# Patient Record
Sex: MALE | URBAN - METROPOLITAN AREA
[De-identification: names, ages, dates, MRNs, and addresses within clinical notes are randomized per-mention and may not be internally consistent; named-entity substitution may affect disease eponyms.]

---

## 2022-02-18 ENCOUNTER — APPOINTMENT (OUTPATIENT)
Dept: URBAN - METROPOLITAN AREA CLINIC 321 | Age: 32
Setting detail: DERMATOLOGY
End: 2022-02-21

## 2022-02-18 DIAGNOSIS — D485 NEOPLASM OF UNCERTAIN BEHAVIOR OF SKIN: ICD-10-CM

## 2022-02-18 PROBLEM — D48.5 NEOPLASM OF UNCERTAIN BEHAVIOR OF SKIN: Status: ACTIVE | Noted: 2022-02-18

## 2022-02-18 PROCEDURE — OTHER DEFER: OTHER

## 2022-02-18 PROCEDURE — 99202 OFFICE O/P NEW SF 15 MIN: CPT

## 2022-02-18 ASSESSMENT — LOCATION SIMPLE DESCRIPTION DERM: LOCATION SIMPLE: RIGHT CHEEK

## 2022-02-18 ASSESSMENT — LOCATION ZONE DERM: LOCATION ZONE: FACE

## 2022-02-18 ASSESSMENT — LOCATION DETAILED DESCRIPTION DERM: LOCATION DETAILED: RIGHT CENTRAL MALAR CHEEK

## 2022-02-18 NOTE — PROCEDURE: DEFER
Instructions (Optional): 10 mm punch
Procedure To Be Performed At Next Visit: Excision by punch method
Introduction Text (Please End With A Colon): The following procedure was deferred:
Detail Level: Simple

## 2022-03-03 ENCOUNTER — APPOINTMENT (OUTPATIENT)
Dept: URBAN - METROPOLITAN AREA CLINIC 321 | Age: 32
Setting detail: DERMATOLOGY
End: 2022-03-04

## 2022-03-03 DIAGNOSIS — D22 MELANOCYTIC NEVI: ICD-10-CM

## 2022-03-03 PROBLEM — D48.5 NEOPLASM OF UNCERTAIN BEHAVIOR OF SKIN: Status: ACTIVE | Noted: 2022-03-03

## 2022-03-03 PROCEDURE — 11441 EXC FACE-MM B9+MARG 0.6-1 CM: CPT

## 2022-03-03 PROCEDURE — 12051 INTMD RPR FACE/MM 2.5 CM/<: CPT

## 2022-03-03 PROCEDURE — OTHER PUNCH EXCISION: OTHER

## 2022-03-03 ASSESSMENT — LOCATION SIMPLE DESCRIPTION DERM: LOCATION SIMPLE: RIGHT CHEEK

## 2022-03-03 ASSESSMENT — LOCATION DETAILED DESCRIPTION DERM: LOCATION DETAILED: RIGHT INFERIOR CENTRAL MALAR CHEEK

## 2022-03-03 ASSESSMENT — LOCATION ZONE DERM: LOCATION ZONE: FACE

## 2022-03-03 NOTE — PROCEDURE: PUNCH EXCISION
Post-Care Instructions: I reviewed with the patient in detail post-care instructions. Patient is to keep the biopsy site dry 36hrs, and then apply Vaseline twice daily until healed. Patient may apply hydrogen peroxide soaks to remove any crusting.

## 2022-03-03 NOTE — PROCEDURE: PUNCH EXCISION
5 Mm Punch Excision Text: A 5 mm punch biopsy was used to excise the lesion to the level of the subcutaneous fat.  Blunt dissection was used to free the lesion from the surrounding tissues and the lesion was removed. -Likely 2/2 GIB.  -Tele monitoring d/c'd s/p no events. Etiology likely not cardiac in origin.  -EKG WNL

## 2022-03-10 ENCOUNTER — APPOINTMENT (OUTPATIENT)
Dept: URBAN - METROPOLITAN AREA CLINIC 321 | Age: 32
Setting detail: DERMATOLOGY
End: 2022-03-11

## 2022-03-10 DIAGNOSIS — Z48.02 ENCOUNTER FOR REMOVAL OF SUTURES: ICD-10-CM

## 2022-03-10 PROCEDURE — OTHER SUTURE REMOVAL (GLOBAL PERIOD): OTHER

## 2022-03-10 PROCEDURE — 99024 POSTOP FOLLOW-UP VISIT: CPT

## 2022-03-10 ASSESSMENT — LOCATION ZONE DERM: LOCATION ZONE: FACE

## 2022-03-10 ASSESSMENT — LOCATION SIMPLE DESCRIPTION DERM: LOCATION SIMPLE: RIGHT CHEEK

## 2022-03-10 ASSESSMENT — LOCATION DETAILED DESCRIPTION DERM: LOCATION DETAILED: RIGHT INFERIOR CENTRAL MALAR CHEEK

## 2022-03-10 NOTE — PROCEDURE: SUTURE REMOVAL (GLOBAL PERIOD)
Detail Level: Detailed
Add 88585 Cpt? (Important Note: In 2017 The Use Of 92307 Is Being Tracked By Cms To Determine Future Global Period Reimbursement For Global Periods): yes

## 2024-04-12 ENCOUNTER — HOSPITAL ENCOUNTER (INPATIENT)
Facility: HOSPITAL | Age: 34
LOS: 1 days | Discharge: HOME | End: 2024-04-15
Attending: EMERGENCY MEDICINE | Admitting: HOSPITALIST
Payer: MEDICAID

## 2024-04-12 ENCOUNTER — APPOINTMENT (OUTPATIENT)
Dept: GENERAL RADIOLOGY | Facility: HOSPITAL | Age: 34
End: 2024-04-12
Attending: EMERGENCY MEDICINE
Payer: MEDICAID

## 2024-04-12 DIAGNOSIS — R22.42 LOCALIZED SWELLING OF TOE OF LEFT FOOT: Primary | ICD-10-CM

## 2024-04-12 DIAGNOSIS — M86.9 OSTEOMYELITIS (HCC): ICD-10-CM

## 2024-04-12 PROBLEM — R79.82 ELEVATED C-REACTIVE PROTEIN (CRP): Status: ACTIVE | Noted: 2024-04-12

## 2024-04-12 PROBLEM — M79.89 MASS OF SOFT TISSUE OF FOOT: Status: ACTIVE | Noted: 2024-04-12

## 2024-04-12 PROBLEM — D72.829 LEUKOCYTOSIS: Status: ACTIVE | Noted: 2024-04-12

## 2024-04-12 PROBLEM — R70.0 ESR RAISED: Status: ACTIVE | Noted: 2024-04-12

## 2024-04-12 LAB
ALBUMIN SERPL-MCNC: 3.6 G/DL (ref 3.4–5)
ALBUMIN/GLOB SERPL: 0.9 {RATIO} (ref 1–2)
ALP LIVER SERPL-CCNC: 80 U/L
ALT SERPL-CCNC: 35 U/L
ANION GAP SERPL CALC-SCNC: 3 MMOL/L (ref 0–18)
AST SERPL-CCNC: 13 U/L (ref 15–37)
BASOPHILS # BLD AUTO: 0.06 X10(3) UL (ref 0–0.2)
BASOPHILS NFR BLD AUTO: 0.5 %
BILIRUB SERPL-MCNC: 0.3 MG/DL (ref 0.1–2)
BUN BLD-MCNC: 9 MG/DL (ref 9–23)
CALCIUM BLD-MCNC: 9.2 MG/DL (ref 8.5–10.1)
CHLORIDE SERPL-SCNC: 111 MMOL/L (ref 98–112)
CO2 SERPL-SCNC: 24 MMOL/L (ref 21–32)
CREAT BLD-MCNC: 0.78 MG/DL
CRP SERPL-MCNC: 1.26 MG/DL (ref ?–0.3)
EGFRCR SERPLBLD CKD-EPI 2021: 120 ML/MIN/1.73M2 (ref 60–?)
EOSINOPHIL # BLD AUTO: 0.06 X10(3) UL (ref 0–0.7)
EOSINOPHIL NFR BLD AUTO: 0.5 %
ERYTHROCYTE [DISTWIDTH] IN BLOOD BY AUTOMATED COUNT: 19.9 %
ERYTHROCYTE [SEDIMENTATION RATE] IN BLOOD: 52 MM/HR
EST. AVERAGE GLUCOSE BLD GHB EST-MCNC: 137 MG/DL (ref 68–126)
GLOBULIN PLAS-MCNC: 3.9 G/DL (ref 2.8–4.4)
GLUCOSE BLD-MCNC: 100 MG/DL (ref 70–99)
GLUCOSE BLD-MCNC: 122 MG/DL (ref 70–99)
GLUCOSE BLD-MCNC: 250 MG/DL (ref 70–99)
HBA1C MFR BLD: 6.4 % (ref ?–5.7)
HCT VFR BLD AUTO: 35.7 %
HGB BLD-MCNC: 11.4 G/DL
IMM GRANULOCYTES # BLD AUTO: 0.04 X10(3) UL (ref 0–1)
IMM GRANULOCYTES NFR BLD: 0.4 %
LYMPHOCYTES # BLD AUTO: 3.24 X10(3) UL (ref 1–4)
LYMPHOCYTES NFR BLD AUTO: 28.4 %
MCH RBC QN AUTO: 25 PG (ref 26–34)
MCHC RBC AUTO-ENTMCNC: 31.9 G/DL (ref 31–37)
MCV RBC AUTO: 78.3 FL
MONOCYTES # BLD AUTO: 0.68 X10(3) UL (ref 0.1–1)
MONOCYTES NFR BLD AUTO: 6 %
NEUTROPHILS # BLD AUTO: 7.32 X10 (3) UL (ref 1.5–7.7)
NEUTROPHILS # BLD AUTO: 7.32 X10(3) UL (ref 1.5–7.7)
NEUTROPHILS NFR BLD AUTO: 64.2 %
OSMOLALITY SERPL CALC.SUM OF ELEC: 285 MOSM/KG (ref 275–295)
PLATELET # BLD AUTO: 359 10(3)UL (ref 150–450)
POTASSIUM SERPL-SCNC: 3.9 MMOL/L (ref 3.5–5.1)
PROT SERPL-MCNC: 7.5 G/DL (ref 6.4–8.2)
RBC # BLD AUTO: 4.56 X10(6)UL
SODIUM SERPL-SCNC: 138 MMOL/L (ref 136–145)
WBC # BLD AUTO: 11.4 X10(3) UL (ref 4–11)

## 2024-04-12 PROCEDURE — 73630 X-RAY EXAM OF FOOT: CPT | Performed by: EMERGENCY MEDICINE

## 2024-04-12 PROCEDURE — 99223 1ST HOSP IP/OBS HIGH 75: CPT | Performed by: HOSPITALIST

## 2024-04-12 PROCEDURE — 99223 1ST HOSP IP/OBS HIGH 75: CPT | Performed by: STUDENT IN AN ORGANIZED HEALTH CARE EDUCATION/TRAINING PROGRAM

## 2024-04-12 RX ORDER — NICOTINE POLACRILEX 4 MG
30 LOZENGE BUCCAL
Status: DISCONTINUED | OUTPATIENT
Start: 2024-04-12 | End: 2024-04-15

## 2024-04-12 RX ORDER — MORPHINE SULFATE 2 MG/ML
1 INJECTION, SOLUTION INTRAMUSCULAR; INTRAVENOUS EVERY 2 HOUR PRN
Status: DISCONTINUED | OUTPATIENT
Start: 2024-04-12 | End: 2024-04-15

## 2024-04-12 RX ORDER — CEFAZOLIN SODIUM/WATER 2 G/20 ML
2 SYRINGE (ML) INTRAVENOUS ONCE
Status: COMPLETED | OUTPATIENT
Start: 2024-04-12 | End: 2024-04-12

## 2024-04-12 RX ORDER — ONDANSETRON 2 MG/ML
4 INJECTION INTRAMUSCULAR; INTRAVENOUS EVERY 4 HOURS PRN
Status: DISCONTINUED | OUTPATIENT
Start: 2024-04-12 | End: 2024-04-12

## 2024-04-12 RX ORDER — NICOTINE POLACRILEX 4 MG
15 LOZENGE BUCCAL
Status: DISCONTINUED | OUTPATIENT
Start: 2024-04-12 | End: 2024-04-15

## 2024-04-12 RX ORDER — BISACODYL 10 MG
10 SUPPOSITORY, RECTAL RECTAL
Status: DISCONTINUED | OUTPATIENT
Start: 2024-04-12 | End: 2024-04-15

## 2024-04-12 RX ORDER — BENZONATATE 100 MG/1
200 CAPSULE ORAL 3 TIMES DAILY PRN
Status: DISCONTINUED | OUTPATIENT
Start: 2024-04-12 | End: 2024-04-15

## 2024-04-12 RX ORDER — ACETAMINOPHEN 500 MG
1000 TABLET ORAL EVERY 6 HOURS PRN
Status: DISCONTINUED | OUTPATIENT
Start: 2024-04-12 | End: 2024-04-15

## 2024-04-12 RX ORDER — CEFAZOLIN SODIUM/WATER 2 G/20 ML
2 SYRINGE (ML) INTRAVENOUS EVERY 8 HOURS
Status: DISCONTINUED | OUTPATIENT
Start: 2024-04-12 | End: 2024-04-15

## 2024-04-12 RX ORDER — SENNOSIDES 8.6 MG
17.2 TABLET ORAL NIGHTLY PRN
Status: DISCONTINUED | OUTPATIENT
Start: 2024-04-12 | End: 2024-04-15

## 2024-04-12 RX ORDER — ENEMA 19; 7 G/133ML; G/133ML
1 ENEMA RECTAL ONCE AS NEEDED
Status: DISCONTINUED | OUTPATIENT
Start: 2024-04-12 | End: 2024-04-15

## 2024-04-12 RX ORDER — MELATONIN
3 NIGHTLY PRN
Status: DISCONTINUED | OUTPATIENT
Start: 2024-04-12 | End: 2024-04-15

## 2024-04-12 RX ORDER — ONDANSETRON 2 MG/ML
4 INJECTION INTRAMUSCULAR; INTRAVENOUS EVERY 6 HOURS PRN
Status: DISCONTINUED | OUTPATIENT
Start: 2024-04-12 | End: 2024-04-15

## 2024-04-12 RX ORDER — POLYETHYLENE GLYCOL 3350 17 G/17G
17 POWDER, FOR SOLUTION ORAL DAILY PRN
Status: DISCONTINUED | OUTPATIENT
Start: 2024-04-12 | End: 2024-04-15

## 2024-04-12 RX ORDER — DEXTROSE MONOHYDRATE 25 G/50ML
50 INJECTION, SOLUTION INTRAVENOUS
Status: DISCONTINUED | OUTPATIENT
Start: 2024-04-12 | End: 2024-04-15

## 2024-04-12 RX ORDER — MORPHINE SULFATE 4 MG/ML
4 INJECTION, SOLUTION INTRAMUSCULAR; INTRAVENOUS EVERY 30 MIN PRN
Status: ACTIVE | OUTPATIENT
Start: 2024-04-12 | End: 2024-04-12

## 2024-04-12 RX ORDER — MORPHINE SULFATE 2 MG/ML
2 INJECTION, SOLUTION INTRAMUSCULAR; INTRAVENOUS EVERY 2 HOUR PRN
Status: DISCONTINUED | OUTPATIENT
Start: 2024-04-12 | End: 2024-04-15

## 2024-04-12 RX ORDER — MORPHINE SULFATE 4 MG/ML
4 INJECTION, SOLUTION INTRAMUSCULAR; INTRAVENOUS EVERY 2 HOUR PRN
Status: DISCONTINUED | OUTPATIENT
Start: 2024-04-12 | End: 2024-04-15

## 2024-04-12 NOTE — CONSULTS
[facility]   part of [facility]    Report of Consultation    Tai Zeng Patient Status:  Observation    1990 MRN [id number]   Location [facility] 3SW-A Attending Abdi Albright MD   Hosp Day # 0 PCP Unknown Pcp     Date of Admission:  2024  Date of Consult:  2024    Reason for Consultation:  Painful soft tissue mass, left 4th/5th toes    History of Present Illness:  Tai Zeng is a a(n) 34 year old male with past medical history of prediabetes who is seen at bedside this afternoon for evaluation of painful soft tissue mass to left fourth and fifth toes.  Patient relates he has had mass to site for several years but it has gotten worse over the last several months.  He was seen at outside hospitals where site was then to be drained but only blood came out per patient.  He presented to the emergency room today site continued to bleed.  He has seen a podiatrist in the past but they recommended toe spacers.  He is wondering what treatment options are for mass.  It is very painful and tends to bleed.  It is also getting larger in size.  He cannot think of any recent trauma or injury or other causes for this development.  No other complaints are mentioned.  Past medical history, indications, and allergies reviewed.    History:  Past Medical History:    Anemia    Diabetes (HCC)    High blood pressure     History reviewed. No pertinent surgical history.  No family history on file.   reports that he has been smoking cigarettes. He has never used smokeless tobacco. He reports current alcohol use. He reports current drug use. Drug: Cannabis.    Allergies:  No Known Allergies    Medications:    Current Facility-Administered Medications:     glucose (Dex4) 15 GM/59ML oral liquid 15 g, 15 g, Oral, Q15 Min PRN **OR** glucose (Glutose) 40% oral gel 15 g, 15 g, Oral, Q15 Min PRN **OR** glucose-vitamin C (Dex-4) chewable tab 4 tablet, 4 tablet, Oral, Q15 Min PRN **OR** dextrose 50%  injection 50 mL, 50 mL, Intravenous, Q15 Min PRN **OR** glucose (Dex4) 15 GM/59ML oral liquid 30 g, 30 g, Oral, Q15 Min PRN **OR** glucose (Glutose) 40% oral gel 30 g, 30 g, Oral, Q15 Min PRN **OR** glucose-vitamin C (Dex-4) chewable tab 8 tablet, 8 tablet, Oral, Q15 Min PRN    acetaminophen (Tylenol Extra Strength) tab 1,000 mg, 1,000 mg, Oral, Q6H PRN    melatonin tab 3 mg, 3 mg, Oral, Nightly PRN    benzonatate (Tessalon) cap 200 mg, 200 mg, Oral, TID PRN    guaiFENesin (Robitussin) 100 MG/5 ML oral liquid 200 mg, 200 mg, Oral, Q4H PRN    ceFAZolin (Ancef) 2 g in 20mL IV syringe premix, 2 g, Intravenous, Q8H    morphINE PF 2 MG/ML injection 1 mg, 1 mg, Intravenous, Q2H PRN **OR** morphINE PF 2 MG/ML injection 2 mg, 2 mg, Intravenous, Q2H PRN **OR** morphINE PF 4 MG/ML injection 4 mg, 4 mg, Intravenous, Q2H PRN    ondansetron (Zofran) 4 MG/2ML injection 4 mg, 4 mg, Intravenous, Q6H PRN    sennosides (Senokot) tab 17.2 mg, 17.2 mg, Oral, Nightly PRN    bisacodyl (Dulcolax) 10 MG rectal suppository 10 mg, 10 mg, Rectal, Daily PRN    fleet enema (Fleet) 7-19 GM/118ML rectal enema 133 mL, 1 enema, Rectal, Once PRN    polyethylene glycol (PEG 3350) (Miralax) 17 g oral packet 17 g, 17 g, Oral, Daily PRN    insulin aspart (NovoLOG) 100 Units/mL FlexPen 1-10 Units, 1-10 Units, Subcutaneous, TID AC and HS    Review of Systems: Denies nausea, vomiting, fever, chills.      Physical Exam:              Derm: Large fluid-filled mass noted distal aspect of left fourth toe and to lesser extent left fifth toe.  Site is movable and tender to touch.  Sanguinous drainage noted with sutures intact to site.  No warmth or other concerns.  See images above.    Vascular: Pedal pulses easily palpable.    Neuro: Gross sensation intact to digits.    MSK: Large soft tissue mass noted distal aspect left fourth toe to lesser extent left fifth toe.  Mild pain noted to sites.  Compartments are soft compressible.  Strength testing  deferred.    Imaging:  XR FOOT, COMPLETE (MIN 3 VIEWS), LEFT (CPT=73630)    Result Date: 4/12/2024  CONCLUSION:  1. Marked masslike soft tissue swelling surrounds the 4th distal phalanx most likely represents patient's known hematoma given the history.  If osteomyelitis is of high clinical concern, recommend MRI for further evaluation.   LOCATION:  [facility]   Dictated by (CST): Ciara Jaquez MD on 4/12/2024 at 10:58 AM     Finalized by (CST): Ciara Jaquez MD on 4/12/2024 at 11:01 AM         Laboratory Data:  Recent Labs   Lab 04/12/24  1028   RBC 4.56   HGB 11.4*   HCT 35.7*   MCV 78.3*   MCH 25.0*   MCHC 31.9   RDW 19.9   NEPRELIM 7.32   WBC 11.4*   .0       Impression and Plan:  Patient Active Problem List   Diagnosis    Localized swelling of toe of left foot    Leukocytosis    Elevated C-reactive protein (CRP)    ESR raised       -Patient examined, chart history reviewed.  -Patient is afebrile, white blood cell 11.4.  -CRP 1.26, ESR 52.  -X-rays reviewed.  Large soft tissue swelling at distal fourth toe.  No erosions or other concerns appreciated.  -MRI with and without contrast ordered to left foot.  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  This has become larger and more painful over the last 2 years.  In my opinion patient will require surgical intervention.  Discussed with patient we will likely consider soft tissue mass resection with or without partial fourth and fifth toe amputation pending MRI results.  -Surgery briefly discussed including benefits, risks, and recovery period.  -Okay for patient weight-bear as tolerated for the time being.  -Will continue to follow.    Papo Mcnally DPM   4/12/2024  4:41 PM

## 2024-04-12 NOTE — ED PROVIDER NOTES
Patient Seen in: [facility] Emergency Department      History     Chief Complaint   Patient presents with    Wound Care     Stated Complaint: hematoma to 4th digit on left foot.  bleeding, increased last night.    Subjective:   HPI    Patient is a 34-year-old male who presents with bleeding from his left fourth toe since last night.  He states it has been very swollen for a couple of years.  Back when it started he states he was wearing some shoes that were too small for him, developed some swelling and pain there.  At 1 point he saw podiatrist who told him it was a hematoma but it has continued to slowly get progressively bigger ever since.  He has been trying to find the another podiatrist for follow-up but has had trouble getting into 1 due to his insurance.  Last night it spontaneously started bleeding from the tip, he has wrapped it up but states it continued to bleed ever since.  It is painful.  No recent trauma.    Objective:   Past Medical History:    Anemia    Diabetes (HCC)              History reviewed. No pertinent surgical history.             Social History     Socioeconomic History    Marital status: Single   Tobacco Use    Smoking status: Every Day     Types: Cigarettes    Smokeless tobacco: Never   Vaping Use    Vaping status: Never Used   Substance and Sexual Activity    Alcohol use: Yes     Comment: occ    Drug use: Yes     Types: Cannabis     Social Determinants of Health     Food Insecurity: Food Insecurity Present (11/30/2023)    Received from [facility]    Hunger Vital Sign     Worried About Running Out of Food in the Last Year: Sometimes true   Transportation Needs: Unknown (11/30/2023)    Received from [facility]    PRAPARE - Transportation     Lack of Transportation (Non-Medical): No   Housing Stability: High Risk (11/30/2023)    Received from [facility]    Housing Stability Vital Sign     Unable to Pay for Housing in the  Last Year: Yes              Review of Systems    Positive for stated complaint: hematoma to 4th digit on left foot.  bleeding, increased last night.  Other systems are as noted in HPI.  Constitutional and vital signs reviewed.      All other systems reviewed and negative except as noted above.    Physical Exam     ED Triage Vitals [04/12/24 0948]   BP (!) 145/98   Pulse 82   Resp 18   Temp 97.2 °F (36.2 °C)   Temp src Temporal   SpO2 99 %   O2 Device None (Room air)       Current:BP (!) 148/92   Pulse 88   Temp 97.2 °F (36.2 °C) (Temporal)   Resp 18   Ht 177.8 cm (5' 10\")   Wt 113.4 kg   SpO2 99%   BMI 35.87 kg/m²         Physical Exam  Vitals and nursing note reviewed.   Constitutional:       Appearance: He is well-developed.   HENT:      Head: Normocephalic and atraumatic.   Eyes:      Conjunctiva/sclera: Conjunctivae normal.      Pupils: Pupils are equal, round, and reactive to light.   Cardiovascular:      Rate and Rhythm: Normal rate and regular rhythm.      Heart sounds: Normal heart sounds.   Pulmonary:      Effort: Pulmonary effort is normal.      Breath sounds: Normal breath sounds.   Abdominal:      General: Bowel sounds are normal.      Palpations: Abdomen is soft.   Musculoskeletal:         General: Normal range of motion.      Comments: The left fourth toe has significant swelling over the distal tip to about 3 times its normal size.  It is not indurated or fluctuant, fairly soft but it is tender.  There is active arterial spurting from a small vessel in a circular open wound at the tip of the toe.  The rest of the toe does have swelling as well although not to the degree that the tip does.   Skin:     General: Skin is warm and dry.   Neurological:      Mental Status: He is alert and oriented to person, place, and time.               ED Course     Labs Reviewed   COMP METABOLIC PANEL (14) - Abnormal; Notable for the following components:       Result Value    Glucose 100 (*)     AST 13 (*)      A/G Ratio 0.9 (*)     All other components within normal limits   SED RATE, WESTERGREN (AUTOMATED) - Abnormal; Notable for the following components:    Sed Rate 52 (*)     All other components within normal limits   C-REACTIVE PROTEIN - Abnormal; Notable for the following components:    C-Reactive Protein 1.26 (*)     All other components within normal limits   HEMOGLOBIN A1C - Abnormal; Notable for the following components:    HgbA1C 6.4 (*)     Estimated Average Glucose 137 (*)     All other components within normal limits   CBC W/ DIFFERENTIAL - Abnormal; Notable for the following components:    WBC 11.4 (*)     HGB 11.4 (*)     HCT 35.7 (*)     MCV 78.3 (*)     MCH 25.0 (*)     All other components within normal limits   CBC WITH DIFFERENTIAL WITH PLATELET    Narrative:     The following orders were created for panel order CBC With Differential With Platelet.  Procedure                               Abnormality         Status                     ---------                               -----------         ------                     CBC W/ DIFFERENTIAL[[id number]]          Abnormal            Final result                 Please view results for these tests on the individual orders.             XR FOOT, COMPLETE (MIN 3 VIEWS), LEFT (CPT=73630)    Result Date: 4/12/2024  PROCEDURE:  XR FOOT, COMPLETE (MIN 3 VIEWS), LEFT (CPT=73630)  TECHNIQUE:  AP, oblique, and lateral views were obtained.  COMPARISON:  None.  INDICATIONS:  hematoma to 4th digit on left foot.  bleeding, increased last night.  PATIENT STATED HISTORY: (As transcribed by Technologist)  Patient's states that his left foot, his toes in particular, began to swell up about a year ago and now they are bleeding. He didn't have any particular injury. He believes the swelling may have started due to wearing the wrong size shoes.    FINDINGS:  BONES:  Osseous structures are intact.  Joint spaces are preserved.  No significant arthropathy.  Mild hallux valgus  deformity.  No dislocation. SOFT TISSUES:  Marked soft tissue swelling surrounds and distal to the 4th distal phalanx.            CONCLUSION:  1. Marked masslike soft tissue swelling surrounds the 4th distal phalanx most likely represents patient's known hematoma given the history.  If osteomyelitis is of high clinical concern, recommend MRI for further evaluation.   LOCATION:  [facility]   Dictated by (CST): Ciara Jaquez MD on 4/12/2024 at 10:58 AM     Finalized by (CST): Ciara Jaquez MD on 4/12/2024 at 11:01 AM               MDM      Patient is a 34-year-old male presenting with bleeding from the tip of his left fourth toe.  He states he has had swelling there that has been slowly progressing for couple of years.  It is painful.  No recent or remote trauma, it started after he wore a pair of shoes that was too tight.  Last night it spontaneously started bleeding and has continued to bleed overnight.  He has it wrapped up and heavily taped but I can visualize a lot of clot around the lateral side of his distal foot.  Upon removal there is significant soft tissue swelling to the tip of the left fourth toe.  Once overlying hematoma was removed there is active arterial spurting from a small vessel in the middle of an open wound at the tip of his toe.  I quickly did a digital block with lidocaine without epinephrine and placed two 4-0 nylon sutures over this open wound and does appear to have hemostasis right now.  Unclear if this is a hematoma although he states has been slowly building up for a couple of years and would not expect just a benign hematoma to do that.  He has a history of prediabetes although admits he does not go to the doctor regularly so it may be that he is diabetic and this is an osteomyelitis or underlying infection.  Labs including sed rate and CRP ordered along with an x-ray.  Admission disposition: 4/12/2024 12:41 PM           Update at 12:35 PM.  X-ray as above with significant soft tissue swelling at  the tip of the toe but no obvious bony abnormality, no fracture or definite osteomyelitis.  Mild leukocytosis inflammatory markers are mildly elevated as well.  Etiology is unclear.  No further bleeding upon repeat examination.  I discussed case with Dr. Mcnally of podiatry who is able to review the x-ray.  He agrees there is marked soft tissue swelling there of unclear etiology.  He does agree that would be reasonable to admit the patient least tonight for further investigation and further evaluation by him.  May need additional imaging including MRI.  Discussed case with Dr. Albright the hospitalist today.  He does agree that it is reasonable to cover patient with a dose of antibiotics.        Past Medical History-anemia, prediabetic osteomyelitis,    Differential diagnosis before testing included bony mass, fracture, soft tissue mass    Co-morbidities that add to the complexity of management include: None    Testing ordered during this visit included labs, x-ray    Radiographic images  I personally reviewed the radiographs and my individual interpretation shows soft tissue swelling, no definite osteomyelitis  I also reviewed the official reports that showed soft tissue swelling, no osteomyelitis      Discussion of management with podiatry, hospitalist        Medications Provided: Ancef            Disposition:    Admission  I have discussed with the patient the results of test, differential diagnosis, and treatment plan. They expressed clear understanding of these instructions and agrees to the plan provided.                                Medical Decision Making      Disposition and Plan     Clinical Impression:  1. Localized swelling of toe of left foot         Disposition:  Admit  4/12/2024 12:41 pm    Follow-up:  No follow-up provider specified.        Medications Prescribed:  There are no discharge medications for this patient.                        Hospital Problems       Present on Admission              ICD-10-CM Noted POA    * (Principal) Localized swelling of toe of left foot R22.42 4/12/2024 Unknown

## 2024-04-12 NOTE — ED QUICK NOTES
Orders for admission, patient is aware of plan and ready to go upstairs. Any questions, please call [facility] MARCELO vázquez  at extension [phone]     Patient Covid vaccination status: Partially vaccinated     COVID Test Ordered in ED: None    COVID Suspicion at Admission: N/A    Running Infusions:  None    Mental Status/LOC at time of transport: a0x3    Other pertinent information:   CIWA score: N/A   NIH score:  N/A

## 2024-04-12 NOTE — H&P
[facility]     Tai Zeng Patient Status:  Emergency    1990 MRN [id number]   Location [facility] EMERGENCY DEPARTMENT Attending Guerrero Silva MD   Hosp Day # 0 PCP Unknown Pcp     Chief Complaint: toe bleeding     Subjective:    History of Present Illness:   Tai Zeng is a 34 year old male with worsening left tongue pain and swelling and now actively bleeding.  Patient states that his left foot has become mildly swollen over the last couple days.  He states that his left fourth toe has been having swelling and issues for 2 years.  However, became increasingly red and swollen and then started actively discharging blood which led him to the emergency department.  He denies fevers or chills.  He is prediabetic.  He is on no other medications.  He denies any other complaints at this time.    History/Other:    Past Medical History:  Past Medical History:    Anemia    Diabetes (HCC)     Past Surgical History:   History reviewed. No pertinent surgical history.   Family History:   No family history on file.  Social History:    reports that he has been smoking cigarettes. He has never used smokeless tobacco. He reports current alcohol use. He reports current drug use. Drug: Cannabis.   Allergies: No Known Allergies  Medications:    No current facility-administered medications on file prior to encounter.     No current outpatient medications on file prior to encounter.     Review of Systems:   A comprehensive review of systems was completed.    Pertinent positives and negatives noted in the HPI.    Objective:   Physical Exam:    BP (!) 146/96   Pulse 88   Temp 97.2 °F (36.2 °C) (Temporal)   Resp 18   Ht 5' 10\" (1.778 m)   Wt 250 lb (113.4 kg)   SpO2 99%   BMI 35.87 kg/m²   General: No acute distress, Alert.    Respiratory: No rhonchi, no wheezes  Cardiovascular: S1, S2. Regular rate and rhythm  Abdomen: Soft, NT/ND, +BS  Neuro: No new focal  deficits  Extremities: Left foot with poor hygiene.  Left fourth digit actively discharging blood swollen and red.    Results:    Labs:    Labs Last 24 Hours:  Recent Labs   Lab 04/12/24  1028   RBC 4.56   HGB 11.4*   HCT 35.7*   MCV 78.3*   MCH 25.0*   MCHC 31.9   RDW 19.9   NEPRELIM 7.32   WBC 11.4*   .0     Recent Labs   Lab 04/12/24  1028   *   BUN 9   CREATSERUM 0.78   EGFRCR 120   CA 9.2   ALB 3.6      K 3.9      CO2 24.0   ALKPHO 80   AST 13*   ALT 35   BILT 0.3   TP 7.5     No results found for: \"PT\", \"INR\"  No results for input(s): \"TROP\", \"TROPHS\", \"CK\" in the last 168 hours.  No results for input(s): \"TROP\", \"PBNP\" in the last 168 hours.  No results for input(s): \"PCT\" in the last 168 hours.  Imaging: Imaging data reviewed in Epic.    Assessment & Plan:      #L foot 4th digit swelling-active bleed, possible cellulitis  -Podaitry consulted  -empiric abx for now  -may need debdridement    #Hyperglycemia- preDM- insulin protocol for now- diet exercise recommended    # Leukocytosis secondary to above  # Elevated CRP and ESR secondary to above      Quality:  DVT Mechanical Prophylaxis:        DVT Pharmacologic Prophylaxis   Medication   None                Code Status: Not on file  Iraheta: No urinary catheter in place  Iraheta Duration (in days):   Central line:    FELIX:     Plan of care discussed with patient, staff     Abdi Albright MD  Supplementary Documentation:   The 21st Century Cures Act makes medical notes like these available to patients in the interest of transparency. Please be advised this is a medical document. Medical documents are intended to carry relevant information, facts as evident, and the clinical opinion of the practitioner. The medical note is intended as peer to peer communication and may appear blunt or direct. It is written in medical language and may contain abbreviations or verbiage that are unfamiliar.

## 2024-04-12 NOTE — PLAN OF CARE
ER admission with left 4th toe swelling/bleeding. Patient is alert and oriented x4. Room air. Continuous pulse oximeter. Non-cardiac electrolyte protocol. Last bowel movement 4/12. Regular diet with accuchecks. Podiatry consult orders pending. Weightbearing as tolerated with surgical shoe in place. Plan is home when medically stable.

## 2024-04-12 NOTE — ED INITIAL ASSESSMENT (HPI)
PT to ED with c/o bleeding to L foot. Pt seen by podiatry last year, dx with \"hematoma\" to toe. +pain. Denies blood thinners, Reports hx DM2

## 2024-04-13 ENCOUNTER — APPOINTMENT (OUTPATIENT)
Dept: MRI IMAGING | Facility: HOSPITAL | Age: 34
End: 2024-04-13
Attending: STUDENT IN AN ORGANIZED HEALTH CARE EDUCATION/TRAINING PROGRAM
Payer: MEDICAID

## 2024-04-13 LAB
GLUCOSE BLD-MCNC: 101 MG/DL (ref 70–99)
GLUCOSE BLD-MCNC: 104 MG/DL (ref 70–99)
GLUCOSE BLD-MCNC: 107 MG/DL (ref 70–99)
GLUCOSE BLD-MCNC: 117 MG/DL (ref 70–99)

## 2024-04-13 PROCEDURE — 73720 MRI LWR EXTREMITY W/O&W/DYE: CPT | Performed by: STUDENT IN AN ORGANIZED HEALTH CARE EDUCATION/TRAINING PROGRAM

## 2024-04-13 PROCEDURE — 99232 SBSQ HOSP IP/OBS MODERATE 35: CPT | Performed by: HOSPITALIST

## 2024-04-13 RX ORDER — GADOTERATE MEGLUMINE 376.9 MG/ML
20 INJECTION INTRAVENOUS
Status: COMPLETED | OUTPATIENT
Start: 2024-04-13 | End: 2024-04-13

## 2024-04-13 NOTE — PLAN OF CARE
Patient A/O x4, VSS on RA, c/o moderate pain. , SCDs. Voiding freely via toilet, last BM 4/12. WBAT w/post-op shoe. Plan for MRI Lt foot and poss surgery per podiatry. Safety measures in place.

## 2024-04-13 NOTE — PLAN OF CARE
A&Ox4. VSS. On room air. . SCDs on BLE. Ankle pumps encouraged. Tolerating regular diet. Last BM 4/12. Voiding freely. Pain controlled. Ambulating with standby assist, post-op shoe to LLE. Plan is for MRI today. Patient updated on plan of care. Safety precautions in place. Call light within reach.

## 2024-04-13 NOTE — PROGRESS NOTES
[facility]   part of [facility]     Hospitalist Progress Note     Tai Zeng Patient Status:  Observation    1990 MRN [id number]   Location [facility] 3SW-A Attending Abdi Albright MD   Hosp Day # 0 PCP Unknown Pcp     Subjective:   About the same     Objective:    Review of Systems:   A comprehensive review of systems was completed; pertinent positive and negatives stated in subjective.  Vital signs:  Temp:  [97.2 °F (36.2 °C)-98.7 °F (37.1 °C)] 98.4 °F (36.9 °C)  Pulse:  [68-88] 72  Resp:  [15-18] 16  BP: (119-150)/(77-98) 119/77  SpO2:  [93 %-99 %] 93 %  Physical Exam:    General: No acute distress    Respiratory: no wheezes, no rhonchi  Cardiovascular: S1, S2, RRR  Abdomen: Soft, NT/ND, +BS  Extremities: no edema, L 4th toe swollen, tender     Diagnostic Data:    Labs:  Recent Labs   Lab 24  1028   WBC 11.4*   HGB 11.4*   MCV 78.3*   .0     Recent Labs   Lab 24  1028   *   BUN 9   CREATSERUM 0.78   CA 9.2   ALB 3.6      K 3.9      CO2 24.0   ALKPHO 80   AST 13*   ALT 35   BILT 0.3   TP 7.5     Estimated Creatinine Clearance: 137.8 mL/min (based on SCr of 0.78 mg/dL).  No results for input(s): \"PTP\", \"INR\" in the last 168 hours.     Microbiology  No results found for this visit on 24.  Imaging: Reviewed in Epic.  Medications:    ceFAZolin  2 g Intravenous Q8H    insulin aspart  1-10 Units Subcutaneous TID AC and HS       Assessment & Plan:    #L foot 4th digit swelling-active bleed, possible cellulitis vs tumor  -Podiatry consulted  -MRI foot pending   -empiric abx for now  -likely surgery - d/w Dr. Arevalo      #Hyperglycemia- preDM- insulin protocol for now- diet exercise recommended  # Leukocytosis secondary to above  # Elevated CRP and ESR secondary to above        Abdi Albright MD  Supplementary Documentation:   Quality:  DVT Mechanical Prophylaxis:     Early ambuation  DVT Pharmacologic Prophylaxis   Medication   None                 Code Status: Not on file  Iraheta: No urinary catheter in place  Iraheta Duration (in days):   Central line:    FELIX:   At this point Mr. Zeng is expected to be discharge to: home     The 21st Century Cures Act makes medical notes like these available to patients in the interest of transparency. Please be advised this is a medical document. Medical documents are intended to carry relevant information, facts as evident, and the clinical opinion of the practitioner. The medical note is intended as peer to peer communication and may appear blunt or direct. It is written in medical language and may contain abbreviations or verbiage that are unfamiliar.

## 2024-04-13 NOTE — PROGRESS NOTES
Seen at bedside this AM. Resting comfortably. MRI pending. Likely surgical intervention on  Monday pending MRI results.

## 2024-04-14 ENCOUNTER — ANESTHESIA EVENT (OUTPATIENT)
Dept: SURGERY | Facility: HOSPITAL | Age: 34
End: 2024-04-14
Payer: MEDICAID

## 2024-04-14 DIAGNOSIS — M86.9 OSTEOMYELITIS (HCC): Primary | ICD-10-CM

## 2024-04-14 LAB
GLUCOSE BLD-MCNC: 108 MG/DL (ref 70–99)
GLUCOSE BLD-MCNC: 127 MG/DL (ref 70–99)
GLUCOSE BLD-MCNC: 132 MG/DL (ref 70–99)
GLUCOSE BLD-MCNC: 182 MG/DL (ref 70–99)

## 2024-04-14 PROCEDURE — 99232 SBSQ HOSP IP/OBS MODERATE 35: CPT | Performed by: HOSPITALIST

## 2024-04-14 PROCEDURE — 99232 SBSQ HOSP IP/OBS MODERATE 35: CPT | Performed by: STUDENT IN AN ORGANIZED HEALTH CARE EDUCATION/TRAINING PROGRAM

## 2024-04-14 NOTE — PROGRESS NOTES
[facility]   part of [facility]     Hospitalist Progress Note     Tai Zeng Patient Status:  Observation    1990 MRN [id number]   Location [facility] 3SW-A Attending Abdi Albright MD   Hosp Day # 0 PCP Unknown Pcp     Subjective:   About the same     Objective:    Review of Systems:   A comprehensive review of systems was completed; pertinent positive and negatives stated in subjective.  Vital signs:  Temp:  [98 °F (36.7 °C)-99.4 °F (37.4 °C)] 98 °F (36.7 °C)  Pulse:  [70-79] 71  Resp:  [16-18] 18  BP: (129-139)/(69-83) 132/69  SpO2:  [97 %-100 %] 100 %  Physical Exam:    General: No acute distress    Respiratory: no wheezes, no rhonchi  Cardiovascular: S1, S2, RRR  Abdomen: Soft, NT/ND, +BS  Extremities: no edema, L 4th toe swollen, tender     Diagnostic Data:    Labs:  Recent Labs   Lab 24  1028   WBC 11.4*   HGB 11.4*   MCV 78.3*   .0     Recent Labs   Lab 24  1028   *   BUN 9   CREATSERUM 0.78   CA 9.2   ALB 3.6      K 3.9      CO2 24.0   ALKPHO 80   AST 13*   ALT 35   BILT 0.3   TP 7.5     Estimated Creatinine Clearance: 137.8 mL/min (based on SCr of 0.78 mg/dL).  No results for input(s): \"PTP\", \"INR\" in the last 168 hours.     Microbiology  No results found for this visit on 24.  Imaging: Reviewed in Epic.  Medications:    ceFAZolin  2 g Intravenous Q8H    insulin aspart  1-10 Units Subcutaneous TID AC and HS       Assessment & Plan:    #L foot 4th digit swelling-active bleed, possible cellulitis vs tumor  -Podiatry consulted  -MRI foot with 4th digit masslike enlargement- suspecting giant cell tumor  -empiric abx for now but will consider stopping post op   -OR timing per Dr. Arevalo - tomorrow     #Hyperglycemia- preDM- insulin protocol for now- diet exercise recommended  # Leukocytosis secondary to above  # Elevated CRP and ESR secondary to above        Abdi Albright MD  Supplementary Documentation:   Quality:  DVT Mechanical  Prophylaxis:     Early ambuation  DVT Pharmacologic Prophylaxis   Medication   None                Code Status: Not on file  Iraheta: No urinary catheter in place  Iraheta Duration (in days):   Central line:    FELIX:   At this point Mr. Zeng is expected to be discharge to: home     The 21st Century Cures Act makes medical notes like these available to patients in the interest of transparency. Please be advised this is a medical document. Medical documents are intended to carry relevant information, facts as evident, and the clinical opinion of the practitioner. The medical note is intended as peer to peer communication and may appear blunt or direct. It is written in medical language and may contain abbreviations or verbiage that are unfamiliar.

## 2024-04-14 NOTE — PLAN OF CARE
A&Ox4. VSS. On room air. . Patient declined SCDs on BLE. Ankle pumps encouraged. Tolerating regular diet. Last BM 4/12. Voiding freely. Pain controlled. Ambulating with standby assist, post-op shoe to LLE. Plan is for surgery tomorrow at 0915, NPO at mn. Patient updated on plan of care. Safety precautions in place. Call light within reach.

## 2024-04-14 NOTE — PROGRESS NOTES
[facility]   part of [facility]    Progress Note/H+P    Tai Zeng Patient Status:  Observation    1990 MRN [id number]   Location [facility] 3SW-A Attending Abdi Albright MD   Hosp Day # 0 PCP Unknown Pcp     Date of Admission:  2024    History of Present Illness:  Tai Zeng is a a(n) 34 year old male with past medical history of prediabetes who is seen at bedside this morning for follow-up of painful soft tissue mass to his left fourth toe.  Patient did complete MRI yesterday which showed possible giant cell tumor.  There was no bony destruction noted on MRI or x-ray.  No other complaints are mentioned.      History:  Past Medical History:    Anemia    Diabetes (HCC)    High blood pressure     History reviewed. No pertinent surgical history.  No family history on file.   reports that he has been smoking cigarettes. He has never used smokeless tobacco. He reports current alcohol use. He reports current drug use. Drug: Cannabis.    Allergies:  No Known Allergies    Medications:    Current Facility-Administered Medications:     glucose (Dex4) 15 GM/59ML oral liquid 15 g, 15 g, Oral, Q15 Min PRN **OR** glucose (Glutose) 40% oral gel 15 g, 15 g, Oral, Q15 Min PRN **OR** glucose-vitamin C (Dex-4) chewable tab 4 tablet, 4 tablet, Oral, Q15 Min PRN **OR** dextrose 50% injection 50 mL, 50 mL, Intravenous, Q15 Min PRN **OR** glucose (Dex4) 15 GM/59ML oral liquid 30 g, 30 g, Oral, Q15 Min PRN **OR** glucose (Glutose) 40% oral gel 30 g, 30 g, Oral, Q15 Min PRN **OR** glucose-vitamin C (Dex-4) chewable tab 8 tablet, 8 tablet, Oral, Q15 Min PRN    acetaminophen (Tylenol Extra Strength) tab 1,000 mg, 1,000 mg, Oral, Q6H PRN    melatonin tab 3 mg, 3 mg, Oral, Nightly PRN    benzonatate (Tessalon) cap 200 mg, 200 mg, Oral, TID PRN    guaiFENesin (Robitussin) 100 MG/5 ML oral liquid 200 mg, 200 mg, Oral, Q4H PRN    ceFAZolin (Ancef) 2 g in 20mL IV syringe premix, 2 g, Intravenous, Q8H     morphINE PF 2 MG/ML injection 1 mg, 1 mg, Intravenous, Q2H PRN **OR** morphINE PF 2 MG/ML injection 2 mg, 2 mg, Intravenous, Q2H PRN **OR** morphINE PF 4 MG/ML injection 4 mg, 4 mg, Intravenous, Q2H PRN    ondansetron (Zofran) 4 MG/2ML injection 4 mg, 4 mg, Intravenous, Q6H PRN    sennosides (Senokot) tab 17.2 mg, 17.2 mg, Oral, Nightly PRN    bisacodyl (Dulcolax) 10 MG rectal suppository 10 mg, 10 mg, Rectal, Daily PRN    fleet enema (Fleet) 7-19 GM/118ML rectal enema 133 mL, 1 enema, Rectal, Once PRN    polyethylene glycol (PEG 3350) (Miralax) 17 g oral packet 17 g, 17 g, Oral, Daily PRN    insulin aspart (NovoLOG) 100 Units/mL FlexPen 1-10 Units, 1-10 Units, Subcutaneous, TID AC and HS    Review of Systems: Denies nausea, vomiting, fever, chills.      Physical Exam:          Derm: Large fluid-filled mass noted distal aspect of left fourth toe and to lesser extent left fifth toe.  Site is movable and tender to touch.  Sanguinous drainage noted with sutures intact to site.  No warmth or other concerns.  See images above.    Vascular: Pedal pulses easily palpable.    Neuro: Gross sensation intact to digits.    MSK: Large soft tissue mass noted distal aspect left fourth toe to lesser extent left fifth toe.  Mild pain noted to sites.  Compartments are soft compressible.  Strength testing deferred.    Imaging:  MRI FOOT (W+WO), LEFT (CPT=73720)    Result Date: 4/13/2024  CONCLUSION:  Marked masslike enlargement surrounding the 4th digit without bony destruction or joint effusion.  This region demonstrates high T2 low T1 signal with marked enhancement.  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  Other malignancy is of consideration but less likely.  A mildly hemorrhagic mass is of consideration.  There are small foci of hemorrhage within this markedly enhancing mass.   LOCATION:  [facility]   Dictated by (CST): Flavio Vargas MD on 4/13/2024 at 12:38 PM      Finalized by (CST): Flavio Vargas MD on 4/13/2024 at 1:04 PM       XR FOOT, COMPLETE (MIN 3 VIEWS), LEFT (CPT=73630)    Result Date: 4/12/2024  CONCLUSION:  1. Marked masslike soft tissue swelling surrounds the 4th distal phalanx most likely represents patient's known hematoma given the history.  If osteomyelitis is of high clinical concern, recommend MRI for further evaluation.   LOCATION:  [facility]   Dictated by (CST): Ciara Jaquez MD on 4/12/2024 at 10:58 AM     Finalized by (CST): Ciara Jaquez MD on 4/12/2024 at 11:01 AM         Laboratory Data:  Recent Labs   Lab 04/12/24  1028   RBC 4.56   HGB 11.4*   HCT 35.7*   MCV 78.3*   MCH 25.0*   MCHC 31.9   RDW 19.9   NEPRELIM 7.32   WBC 11.4*   .0       Impression and Plan:  Patient Active Problem List   Diagnosis    Localized swelling of toe of left foot    Leukocytosis    Elevated C-reactive protein (CRP)    ESR raised    Mass of soft tissue of foot       -Patient examined, chart history reviewed.  -Patient is afebrile, white blood cell 11.4.  -CRP 1.26, ESR 52.  -X-rays reviewed.  Large soft tissue swelling at distal fourth toe.  No erosions or other concerns appreciated.  -MRI reviewed--possible giant cell tumor to fourth toe.  -Discussed with patient that it appears he has a vascular tumor distal aspect of left fourth toe.  It does appear that the mass in his fourth toes causing pain in his fifth toe from rubbing on site.  Does not appear to be secondary mass of the toe at this time.   -Discussed treatment options at length with patient.  I would recommend attempt at soft tissue mass excision at this time.  Discussed with patient there is a chance he may require partial or complete toe amputation.  Will try and resect the mass without amputated toe at this time.    Surgery was discussed in great detail including benefits, risks, recovery period.  Risks include, but are not limited to, recurrence, infection, pain, deformity, need for revision surgery, loss  of digit, and need for prolonged wound care.  If site does recur patient may also require partial toe amputation in future.  He is also being consulted for partial toe potation at this time even though we will try and resect mass without removing part of the digit.    All treatment options have been discussed with the patient including both conservative and surgical attempts at correction. Potential risks and complications of surgical intervention were discussed at length which including but not not limited to death, loss of limb, post op pain, swelling, infection, bleeding, reoccurrence of the deformity, extended healing, and the possibility of further and future surgery.  No guarantees have been made to the patient and the informed consent has been signed.     Surgery has been scheduled for tomorrow at 9 AM.  Please have patient n.p.o. at midnight.  All patient's questions were answered to satisfaction.    Papo Mcnally DPM

## 2024-04-14 NOTE — PLAN OF CARE
Patient A/O x4, VSS on RA, c/o moderate pain. , SCDs. Voiding freely via toilet, last BM 4/12. WBAT w/post-op shoe. Plan for poss surgery monday per podiatry. Safety measures in place.

## 2024-04-15 ENCOUNTER — ANESTHESIA (OUTPATIENT)
Dept: SURGERY | Facility: HOSPITAL | Age: 34
End: 2024-04-15
Payer: MEDICAID

## 2024-04-15 ENCOUNTER — TELEPHONE (OUTPATIENT)
Dept: PODIATRY CLINIC | Facility: CLINIC | Age: 34
End: 2024-04-15

## 2024-04-15 VITALS
RESPIRATION RATE: 18 BRPM | BODY MASS INDEX: 35.79 KG/M2 | TEMPERATURE: 99 F | SYSTOLIC BLOOD PRESSURE: 118 MMHG | DIASTOLIC BLOOD PRESSURE: 78 MMHG | HEART RATE: 66 BPM | HEIGHT: 70 IN | OXYGEN SATURATION: 95 % | WEIGHT: 250 LBS

## 2024-04-15 LAB
GLUCOSE BLD-MCNC: 108 MG/DL (ref 70–99)
GLUCOSE BLD-MCNC: 109 MG/DL (ref 70–99)

## 2024-04-15 PROCEDURE — 99238 HOSP IP/OBS DSCHRG MGMT 30/<: CPT | Performed by: HOSPITALIST

## 2024-04-15 PROCEDURE — 0LBW0ZZ EXCISION OF LEFT FOOT TENDON, OPEN APPROACH: ICD-10-PCS | Performed by: STUDENT IN AN ORGANIZED HEALTH CARE EDUCATION/TRAINING PROGRAM

## 2024-04-15 PROCEDURE — 28039 EXC FOOT/TOE TUM SC 1.5 CM/>: CPT | Performed by: STUDENT IN AN ORGANIZED HEALTH CARE EDUCATION/TRAINING PROGRAM

## 2024-04-15 RX ORDER — ONDANSETRON 2 MG/ML
INJECTION INTRAMUSCULAR; INTRAVENOUS AS NEEDED
Status: DISCONTINUED | OUTPATIENT
Start: 2024-04-15 | End: 2024-04-15 | Stop reason: SURG

## 2024-04-15 RX ORDER — SODIUM CHLORIDE, SODIUM LACTATE, POTASSIUM CHLORIDE, CALCIUM CHLORIDE 600; 310; 30; 20 MG/100ML; MG/100ML; MG/100ML; MG/100ML
INJECTION, SOLUTION INTRAVENOUS CONTINUOUS
Status: DISCONTINUED | OUTPATIENT
Start: 2024-04-15 | End: 2024-04-15 | Stop reason: HOSPADM

## 2024-04-15 RX ORDER — MIDAZOLAM HYDROCHLORIDE 1 MG/ML
INJECTION INTRAMUSCULAR; INTRAVENOUS AS NEEDED
Status: DISCONTINUED | OUTPATIENT
Start: 2024-04-15 | End: 2024-04-15 | Stop reason: SURG

## 2024-04-15 RX ORDER — LIDOCAINE HYDROCHLORIDE 10 MG/ML
INJECTION, SOLUTION EPIDURAL; INFILTRATION; INTRACAUDAL; PERINEURAL AS NEEDED
Status: DISCONTINUED | OUTPATIENT
Start: 2024-04-15 | End: 2024-04-15 | Stop reason: SURG

## 2024-04-15 RX ORDER — HYDROMORPHONE HYDROCHLORIDE 1 MG/ML
0.6 INJECTION, SOLUTION INTRAMUSCULAR; INTRAVENOUS; SUBCUTANEOUS EVERY 5 MIN PRN
Status: DISCONTINUED | OUTPATIENT
Start: 2024-04-15 | End: 2024-04-15 | Stop reason: HOSPADM

## 2024-04-15 RX ORDER — GLYCOPYRROLATE 0.2 MG/ML
INJECTION, SOLUTION INTRAMUSCULAR; INTRAVENOUS AS NEEDED
Status: DISCONTINUED | OUTPATIENT
Start: 2024-04-15 | End: 2024-04-15 | Stop reason: SURG

## 2024-04-15 RX ORDER — NALOXONE HYDROCHLORIDE 0.4 MG/ML
0.08 INJECTION, SOLUTION INTRAMUSCULAR; INTRAVENOUS; SUBCUTANEOUS AS NEEDED
Status: DISCONTINUED | OUTPATIENT
Start: 2024-04-15 | End: 2024-04-15 | Stop reason: HOSPADM

## 2024-04-15 RX ORDER — LIDOCAINE HYDROCHLORIDE 10 MG/ML
INJECTION, SOLUTION INFILTRATION; PERINEURAL AS NEEDED
Status: DISCONTINUED | OUTPATIENT
Start: 2024-04-15 | End: 2024-04-15 | Stop reason: HOSPADM

## 2024-04-15 RX ORDER — ONDANSETRON 2 MG/ML
4 INJECTION INTRAMUSCULAR; INTRAVENOUS EVERY 6 HOURS PRN
Status: DISCONTINUED | OUTPATIENT
Start: 2024-04-15 | End: 2024-04-15 | Stop reason: HOSPADM

## 2024-04-15 RX ORDER — MIDAZOLAM HYDROCHLORIDE 1 MG/ML
1 INJECTION INTRAMUSCULAR; INTRAVENOUS EVERY 5 MIN PRN
Status: DISCONTINUED | OUTPATIENT
Start: 2024-04-15 | End: 2024-04-15 | Stop reason: HOSPADM

## 2024-04-15 RX ORDER — HYDROMORPHONE HYDROCHLORIDE 1 MG/ML
0.2 INJECTION, SOLUTION INTRAMUSCULAR; INTRAVENOUS; SUBCUTANEOUS EVERY 5 MIN PRN
Status: DISCONTINUED | OUTPATIENT
Start: 2024-04-15 | End: 2024-04-15 | Stop reason: HOSPADM

## 2024-04-15 RX ORDER — ROCURONIUM BROMIDE 10 MG/ML
INJECTION, SOLUTION INTRAVENOUS AS NEEDED
Status: DISCONTINUED | OUTPATIENT
Start: 2024-04-15 | End: 2024-04-15 | Stop reason: SURG

## 2024-04-15 RX ORDER — PROCHLORPERAZINE EDISYLATE 5 MG/ML
5 INJECTION INTRAMUSCULAR; INTRAVENOUS EVERY 8 HOURS PRN
Status: DISCONTINUED | OUTPATIENT
Start: 2024-04-15 | End: 2024-04-15 | Stop reason: HOSPADM

## 2024-04-15 RX ORDER — AMOXICILLIN AND CLAVULANATE POTASSIUM 875; 125 MG/1; MG/1
1 TABLET, FILM COATED ORAL 2 TIMES DAILY
Qty: 20 TABLET | Refills: 0 | Status: SHIPPED | OUTPATIENT
Start: 2024-04-15 | End: 2024-04-25

## 2024-04-15 RX ORDER — DIPHENHYDRAMINE HYDROCHLORIDE 50 MG/ML
12.5 INJECTION INTRAMUSCULAR; INTRAVENOUS AS NEEDED
Status: DISCONTINUED | OUTPATIENT
Start: 2024-04-15 | End: 2024-04-15 | Stop reason: HOSPADM

## 2024-04-15 RX ORDER — BUPIVACAINE HYDROCHLORIDE 5 MG/ML
INJECTION, SOLUTION EPIDURAL; INTRACAUDAL AS NEEDED
Status: DISCONTINUED | OUTPATIENT
Start: 2024-04-15 | End: 2024-04-15 | Stop reason: HOSPADM

## 2024-04-15 RX ORDER — HYDROMORPHONE HYDROCHLORIDE 1 MG/ML
0.4 INJECTION, SOLUTION INTRAMUSCULAR; INTRAVENOUS; SUBCUTANEOUS EVERY 5 MIN PRN
Status: DISCONTINUED | OUTPATIENT
Start: 2024-04-15 | End: 2024-04-15 | Stop reason: HOSPADM

## 2024-04-15 RX ORDER — NEOSTIGMINE METHYLSULFATE 1 MG/ML
INJECTION, SOLUTION INTRAVENOUS AS NEEDED
Status: DISCONTINUED | OUTPATIENT
Start: 2024-04-15 | End: 2024-04-15 | Stop reason: SURG

## 2024-04-15 RX ORDER — MEPERIDINE HYDROCHLORIDE 25 MG/ML
12.5 INJECTION INTRAMUSCULAR; INTRAVENOUS; SUBCUTANEOUS AS NEEDED
Status: DISCONTINUED | OUTPATIENT
Start: 2024-04-15 | End: 2024-04-15 | Stop reason: HOSPADM

## 2024-04-15 RX ORDER — HYDROCODONE BITARTRATE AND ACETAMINOPHEN 5; 325 MG/1; MG/1
1-2 TABLET ORAL EVERY 4 HOURS PRN
Qty: 30 TABLET | Refills: 0 | Status: SHIPPED | OUTPATIENT
Start: 2024-04-15

## 2024-04-15 RX ORDER — INSULIN ASPART 100 [IU]/ML
INJECTION, SOLUTION INTRAVENOUS; SUBCUTANEOUS ONCE
Status: DISCONTINUED | OUTPATIENT
Start: 2024-04-15 | End: 2024-04-15 | Stop reason: HOSPADM

## 2024-04-15 RX ADMIN — ONDANSETRON 4 MG: 2 INJECTION INTRAMUSCULAR; INTRAVENOUS at 10:48:00

## 2024-04-15 RX ADMIN — GLYCOPYRROLATE 0.8 MG: 0.2 INJECTION, SOLUTION INTRAMUSCULAR; INTRAVENOUS at 10:50:00

## 2024-04-15 RX ADMIN — CEFAZOLIN SODIUM/WATER 2 G: 2 G/20 ML SYRINGE (ML) INTRAVENOUS at 10:29:00

## 2024-04-15 RX ADMIN — MIDAZOLAM HYDROCHLORIDE 2 MG: 1 INJECTION INTRAMUSCULAR; INTRAVENOUS at 10:10:00

## 2024-04-15 RX ADMIN — LIDOCAINE HYDROCHLORIDE 50 MG: 10 INJECTION, SOLUTION EPIDURAL; INFILTRATION; INTRACAUDAL; PERINEURAL at 10:12:00

## 2024-04-15 RX ADMIN — ROCURONIUM BROMIDE 70 MG: 10 INJECTION, SOLUTION INTRAVENOUS at 10:20:00

## 2024-04-15 RX ADMIN — NEOSTIGMINE METHYLSULFATE 5 MG: 1 INJECTION, SOLUTION INTRAVENOUS at 10:50:00

## 2024-04-15 NOTE — BRIEF OP NOTE
Pre-Operative Diagnosis: Painful soft tissue mass, left 4th toe     Post-Operative Diagnosis: Same     Procedure Performed:   Soft tissue mass excision, left 4th toe    Surgeons and Role:     * Papo Mcnally DPM - Primary    Assistant(s):   none     Surgical Findings: Soft tissue mass appeared to arise from flexor tendon of toe     Specimen: Soft tissue mass and redundant skin, left 4th toe--path     Estimated Blood Loss: 5cc    Dictation Number:  See Epic    Papo Mcnally DPM  4/15/2024  10:59 AM

## 2024-04-15 NOTE — ANESTHESIA POSTPROCEDURE EVALUATION
[facility] Patient Status:  Inpatient   Age/Gender 34 year old male MRN [id number]   Location [facility] POST ANESTHESIA CARE UNIT Attending Abdi Albright MD   Hosp Day # 1 PCP Unknown Pcp       Anesthesia Post-op Note    EXCISION SOFT TISSUE MASS LEFT 4TH.  TOE POSS. LEFT TOE  AMPUTATION    Procedure Summary       Date: 04/15/24 Room / Location:  MAIN OR 04 / [facility] MAIN OR    Anesthesia Start: 1007 Anesthesia Stop: 1108    Procedure: EXCISION SOFT TISSUE MASS LEFT 4TH.  TOE POSS. LEFT TOE  AMPUTATION (Left: Toe) Diagnosis:       Osteomyelitis (HCC)      (Osteomyelitis (HCC) [M86.9])    Surgeons: Papo Mcnally DPM Anesthesiologist: Kirby Badillo MD    Anesthesia Type: general ASA Status: 2            Anesthesia Type: general    Vitals Value Taken Time   /90 04/15/24 1105   Temp 99.4 °F (37.4 °C) 04/15/24 1100   Pulse 82 04/15/24 1108   Resp 10 04/15/24 1108   SpO2 90 % 04/15/24 1108   Vitals shown include unfiled device data.    Patient Location: PACU    Anesthesia Type: general    Airway Patency: patent    Postop Pain Control: adequate    Mental Status: mildly sedated but able to meaningfully participate in the post-anesthesia evaluation    Nausea/Vomiting: none    Cardiopulmonary/Hydration status: stable euvolemic    Complications: no apparent anesthesia related complications    Postop vital signs: stable    Dental Exam: Unchanged from Preop    Patient to be discharged from PACU when criteria met.

## 2024-04-15 NOTE — PLAN OF CARE
Pt A&Ox4, VSS. RA on . Pt denies any pain at this time. Post op dressing C/D/I. Up WBAT with the post op shoe. Spoke with podiatry, patient cleared to discharge.Plan to DC today. Call light in reach safety measures in place

## 2024-04-15 NOTE — PROGRESS NOTES
[facility]   part of [facility]     Hospitalist Progress Note     Tai Zeng Patient Status:  Observation    1990 MRN [id number]   Location [facility] 3SW-A Attending Abdi Albright MD   Hosp Day # 1 PCP Unknown Pcp     Subjective:   No overnight events     Objective:    Review of Systems:   A comprehensive review of systems was completed; pertinent positive and negatives stated in subjective.  Vital signs:  Temp:  [98 °F (36.7 °C)-99.1 °F (37.3 °C)] 98.6 °F (37 °C)  Pulse:  [65-82] 78  Resp:  [18] 18  BP: (131-134)/(69-92) 133/78  SpO2:  [98 %-100 %] 100 %  Physical Exam:    General: No acute distress    Respiratory: no wheezes, no rhonchi  Cardiovascular: S1, S2, RRR  Abdomen: Soft, NT/ND, +BS  Extremities: no edema, L 4th toe swollen, tender     Diagnostic Data:    Labs:  Recent Labs   Lab 24  1028   WBC 11.4*   HGB 11.4*   MCV 78.3*   .0     Recent Labs   Lab 24  1028   *   BUN 9   CREATSERUM 0.78   CA 9.2   ALB 3.6      K 3.9      CO2 24.0   ALKPHO 80   AST 13*   ALT 35   BILT 0.3   TP 7.5     Estimated Creatinine Clearance: 137.8 mL/min (based on SCr of 0.78 mg/dL).  No results for input(s): \"PTP\", \"INR\" in the last 168 hours.     Microbiology  No results found for this visit on 24.  Imaging: Reviewed in Epic.  Medications:    ceFAZolin  2 g Intravenous Q8H    insulin aspart  1-10 Units Subcutaneous TID AC and HS       Assessment & Plan:    #L foot 4th digit swelling-active bleed, possible cellulitis vs tumor  -Podiatry consulted  -MRI foot with 4th digit masslike enlargement- suspecting giant cell tumor  -empiric abx for now but will consider stopping post op   -OR today doing well      #Hyperglycemia- preDM- insulin protocol for now- diet exercise recommended  # Leukocytosis secondary to above  # Elevated CRP and ESR secondary to above    DC home     Abdi Albright MD  Supplementary Documentation:   Quality:  DVT Mechanical  Prophylaxis:     Early ambuation  DVT Pharmacologic Prophylaxis   Medication   None                Code Status: Not on file  Iraheta: No urinary catheter in place  Iraheta Duration (in days):   Central line:    FELIX:   At this point Mr. Zeng is expected to be discharge to: home     The 21st Century Cures Act makes medical notes like these available to patients in the interest of transparency. Please be advised this is a medical document. Medical documents are intended to carry relevant information, facts as evident, and the clinical opinion of the practitioner. The medical note is intended as peer to peer communication and may appear blunt or direct. It is written in medical language and may contain abbreviations or verbiage that are unfamiliar.

## 2024-04-15 NOTE — ANESTHESIA PROCEDURE NOTES
Airway  Date/Time: 4/15/2024 10:23 AM  Urgency: elective    Airway not difficult    General Information and Staff    Patient location during procedure: OR  Anesthesiologist: Kirby Badillo MD  Performed: anesthesiologist   Performed by: Kirby Badillo MD  Authorized by: Kirby Badillo MD      Indications and Patient Condition  Indications for airway management: anesthesia  Spontaneous Ventilation: absent  Sedation level: deep  Preoxygenated: yes  Patient position: sniffing  Mask difficulty assessment: 2 - vent by mask + OA or adjuvant +/- NMBA    Final Airway Details  Final airway type: endotracheal airway      Successful airway: ETT  Cuffed: yes   Successful intubation technique: Video laryngoscopy  Endotracheal tube insertion site: oral  Blade: GlideScope  Blade size: #3  ETT size (mm): 8.0    Cormack-Lehane Classification: grade IIB - view of arytenoids or posterior of glottis only  Placement verified by: capnometry   Cuff volume (mL): 10  Measured from: lips  ETT to lips (cm): 24  Number of attempts at approach: 1    Additional Comments  Redundant tissue in mouth, LMA seated but not ventilating adequately, changed to ETT-atraumatic

## 2024-04-15 NOTE — ANESTHESIA PREPROCEDURE EVALUATION
PRE-OP EVALUATION    Patient Name: Tai Zeng    Admit Diagnosis: Localized swelling of toe of left foot [R22.42]    Pre-op Diagnosis: Osteomyelitis (HCC) [M86.9]    EXCISION SOFT TISSUE MASS LEFT 4TH.  TOE POSS. LEFT TOE  AMPUTATION    Anesthesia Procedure: EXCISION SOFT TISSUE MASS LEFT 4TH.  TOE POSS. LEFT TOE  AMPUTATION (Left)    Surgeons and Role:     * Block, Papo Padilla DPM - Primary    Pre-op vitals reviewed.  Temp: 98.5 °F (36.9 °C)  Pulse: 83  Resp: 17  BP: 121/84  SpO2: 99 %  Body mass index is 35.87 kg/m².    Current medications reviewed.  Hospital Medications:   [COMPLETED] gadoterate meglumine (Dotarem) 10 MMOL/20ML injection 20 mL  20 mL Intravenous ONCE PRN    [COMPLETED] ceFAZolin (Ancef) 2 g in 20mL IV syringe premix  2 g Intravenous Once    [] morphINE PF 4 MG/ML injection 4 mg  4 mg Intravenous Q30 Min PRN    glucose (Dex4) 15 GM/59ML oral liquid 15 g  15 g Oral Q15 Min PRN    Or    glucose (Glutose) 40% oral gel 15 g  15 g Oral Q15 Min PRN    Or    glucose-vitamin C (Dex-4) chewable tab 4 tablet  4 tablet Oral Q15 Min PRN    Or    dextrose 50% injection 50 mL  50 mL Intravenous Q15 Min PRN    Or    glucose (Dex4) 15 GM/59ML oral liquid 30 g  30 g Oral Q15 Min PRN    Or    glucose (Glutose) 40% oral gel 30 g  30 g Oral Q15 Min PRN    Or    glucose-vitamin C (Dex-4) chewable tab 8 tablet  8 tablet Oral Q15 Min PRN    acetaminophen (Tylenol Extra Strength) tab 1,000 mg  1,000 mg Oral Q6H PRN    melatonin tab 3 mg  3 mg Oral Nightly PRN    benzonatate (Tessalon) cap 200 mg  200 mg Oral TID PRN    guaiFENesin (Robitussin) 100 MG/5 ML oral liquid 200 mg  200 mg Oral Q4H PRN    ceFAZolin (Ancef) 2 g in 20mL IV syringe premix  2 g Intravenous Q8H    morphINE PF 2 MG/ML injection 1 mg  1 mg Intravenous Q2H PRN    Or    morphINE PF 2 MG/ML injection 2 mg  2 mg Intravenous Q2H PRN    Or    morphINE PF 4 MG/ML injection 4 mg  4 mg Intravenous Q2H PRN    ondansetron (Zofran) 4 MG/2ML  injection 4 mg  4 mg Intravenous Q6H PRN    sennosides (Senokot) tab 17.2 mg  17.2 mg Oral Nightly PRN    bisacodyl (Dulcolax) 10 MG rectal suppository 10 mg  10 mg Rectal Daily PRN    fleet enema (Fleet) 7-19 GM/118ML rectal enema 133 mL  1 enema Rectal Once PRN    polyethylene glycol (PEG 3350) (Miralax) 17 g oral packet 17 g  17 g Oral Daily PRN    insulin aspart (NovoLOG) 100 Units/mL FlexPen 1-10 Units  1-10 Units Subcutaneous TID AC and HS       Outpatient Medications:     No medications prior to admission.       Allergies: Patient has no known allergies.      Anesthesia Evaluation    Patient summary reviewed.    Anesthetic Complications  (-) history of anesthetic complications         GI/Hepatic/Renal    Negative GI/hepatic/renal ROS.                             Cardiovascular                (+) obesity  (+) hypertension                                     Endo/Other      (+) diabetes                            Pulmonary    Negative pulmonary ROS.                       Neuro/Psych    Negative neuro/psych ROS.                          smoker        History reviewed. No pertinent surgical history.  Social History     Socioeconomic History    Marital status: Single   Tobacco Use    Smoking status: Every Day     Types: Cigarettes    Smokeless tobacco: Never   Vaping Use    Vaping status: Never Used   Substance and Sexual Activity    Alcohol use: Yes     Comment: occ    Drug use: Yes     Types: Cannabis     History   Drug Use    Types: Cannabis     Available pre-op labs reviewed.  Lab Results   Component Value Date    WBC 11.4 (H) 04/12/2024    RBC 4.56 04/12/2024    HGB 11.4 (L) 04/12/2024    HCT 35.7 (L) 04/12/2024    MCV 78.3 (L) 04/12/2024    MCH 25.0 (L) 04/12/2024    MCHC 31.9 04/12/2024    RDW 19.9 04/12/2024    .0 04/12/2024     Lab Results   Component Value Date     04/12/2024    K 3.9 04/12/2024     04/12/2024    CO2 24.0 04/12/2024    BUN 9 04/12/2024    CREATSERUM 0.78 04/12/2024      (H) 04/12/2024    CA 9.2 04/12/2024             ASA: 2   Plan: general  NPO status verified and     Post-procedure pain management plan discussed with surgeon and patient.    Comment: GETA/LMA discussed in detail.  Risk of complications discussed including but not limited to sore throat, cough, PONV discussed. Also, discussed risks including dental injury particularly on any weakened, treated or diseased teeth & pt wishes to proceed  All questions answered.    Plan/risks discussed with: patient                Present on Admission:  **None**

## 2024-04-15 NOTE — PROGRESS NOTES
NURSING DISCHARGE NOTE    Discharged Home via Wheelchair.  Accompanied by Support staff  Belongings Taken by patient/family.  PIV removed. Discharge paperwork reviewed, all questions answered at this time.

## 2024-04-15 NOTE — OPERATIVE REPORT
OPERATIVE REPORT     Tai Zeng Patient Status:  Inpatient    1990 MRN [id number]   [facility] 3SW-A Attending Abdi Albright MD   Hosp Day # 1 PCP Unknown Pcp     Date of Surgery:  4/15/2024    Preoperative Diagnosis:  Painful soft tissue mass consistent with giant cell tumor, left 4th toe     Postoperative Diagnosis: same    Primary Surgeon: Papo Mcnally DPM    Assistant: none    Procedures: Soft tissue mass excision, left 4th toe    Surgical Findings: soft tissue mass appeared to arise from flexor tendon of toe    Anesthesia: General    Complications: none    Implants: none    Specimen: Soft tissue mass and redundant skin, left 4th toe--path     Drains: none    Condition: Vital signs stable with CFT intact to digits    Estimated Blood Loss: 5cc    Preoperative history/indications:  Patient is a very pleasant 34-year-old male who has been followed in hospital for painful soft tissue mass to his left fourth toe.  He has had site 2 years but has not undergone treatment for it.  He has been seen at numerous hospitals.  He did complete MRI during this admission showing possible giant cell tumor.  He has a lot of pain and mass that continues to grow bigger and bleed.  He is hoping to have it removed.  He has been scheduled for surgical excision of mass at this time.    Informed Consent:  All treatment options have been discussed with the patient including both conservative and surgical attempts at correction. Potential risks and complications of surgical intervention were discussed at length including but not limited to death, loss of limb, post op pain, swelling, infection, bleeding, reoccurrence, extended healing,  and the possibility of further and future surgery.  No guarantees have been made to the patient and the informed consent has been signed.    Procedure in detail:  The patient was brought into the operating room and placed on the operating table in the supine position.  A  timeout was called in the presence of the anesthesiologist, circulating nurse, scrub tech, and myself to confirm the correct patient, patient's date of birth, procedure, and location of the procedure to be performed.  All present were in agreement.  A pneumatic tourniquet was placed about the patient's left ankle.  Following IV sedation, a local infiltrative block was administered about the left fourth rib utilizing 20 ccs of a 1:1 mixture of 1% lidocaine plain and 0.5% marcaine plain. The left foot was then scrubbed, prepped, and draped in the usual aseptic manner.  An Esmarch bandage was utilized to exsanguinate the lower extremity and the pneumatic tourniquet was inflated to 250 mmHg where it remained for the duration of the procedure.  Attention was directed to the left fourth toe where a large soft tissue mass was noted at the plantar aspect of the fourth toe.  A large wedge incision was made to the plantar aspect of the digit with care taken to preserve healthy skin flaps medially and laterally.  Dissection was continued with sharp blunt dissection to preserve healthy skin and dissect the soft tissue mass free.  The mass was large and vascular in appearance.  Dissection was continued the mass did appear to arise from the flexor tendon on the plantar aspect of the fourth toe.  A portion of the soft tissue mass and flexor tendon was resected at this time.  The soft tissue mass was removed in field in toto.  The mass measured 4 cm x 3 cm x 3 cm.  The mass was sent to pathology for further evaluation.  At this time the site was inspected.  No evidence of remaining mass was appreciated.  The site was rickie irrigated with saline.  Skin was easily reapproximated with 3-0 nylon suture without excessive tension.    The incision was dressed with betadine soaked adaptic and covered with sterile compressive dressings consisting of gauze, kerlix, and a mildly compressive ACE wrap.  The pneumatic ankle tourniquet was then  deflated at 17 minutes and a prompt hyperemic response was noted to all digits of the left foot.  The patient tolerated the procedure and anesthesia well.  He was transferred to the recovery room with VSS and vascular status intact.  Following a period of postoperative monitoring, the patient will return to the floor for continued care as an inpatient.    Papo Mcnally, TRIXIE   4/15/2024

## 2024-04-15 NOTE — DISCHARGE INSTRUCTIONS
Surgical dressing to remain in place until follow up visit.    Monitor for signs of infection:  - drainage  - foul smelling odor  - redness and irritation    Weight bearing as tolerated to the left foot, using the post-op shoe.    May use prescribed pain medications if needed. As you begin to heal, you should wean yourself off of these medications (Norco)    For any questions, contact Dr. Mcnally's office

## 2024-04-16 ENCOUNTER — TELEPHONE (OUTPATIENT)
Dept: PODIATRY CLINIC | Facility: CLINIC | Age: 34
End: 2024-04-16

## 2024-04-16 NOTE — TELEPHONE ENCOUNTER
Spoke with patient. Endorses a golf ball sized area of bleed through on bottom of bandage that has not gotten bigger since last night, Advised that some bleed through is normal as long as it does not continue or is excessive. I sent him a new [employer] link to get signed up and to have alternative ways to communicate.. He made post op appointment at 8:00 am on 4/26/24. Location and directions given. Advised to reach out if any further concerns. Patient also wanted to personally thank Dr. Mcnally for his attentiveness and quick action.

## 2024-04-16 NOTE — DISCHARGE SUMMARY
[facility]     Tai Zeng Patient Status:  Inpatient    1990 MRN [id number]   Location [facility] 3SW-A Attending No att. providers found   Hosp Day # 1 PCP Unknown Pcp     Date of Admission: 2024  Date of Discharge: 4/15/2024    Discharge Disposition: Home or Self Care    Admitting Diagnosis:   Localized swelling of toe of left foot [R22.42]    Hospital Discharge Diagnoses:  #L foot 4th digit swelling-active bleed, possible cellulitis vs tumor  -Podiatry consulted  -MRI foot with 4th digit masslike enlargement- suspecting giant cell tumor  -empiric abx for now but will consider stopping post op   -OR today doing well      #Hyperglycemia- preDM- insulin protocol for now- diet exercise recommended  # Leukocytosis secondary to above  # Elevated CRP and ESR secondary to above    Lace+ Score: 22  59-90 High Risk  29-58 Medium Risk  0-28   Low Risk.     History of Present Illness: Tai Zeng is a 34 year old male with worsening left tongue pain and swelling and now actively bleeding.  Patient states that his left foot has become mildly swollen over the last couple days.  He states that his left fourth toe has been having swelling and issues for 2 years.  However, became increasingly red and swollen and then started actively discharging blood which led him to the emergency department.  He denies fevers or chills.  He is prediabetic.  He is on no other medications.  He denies any other complaints at this time.     Brief Synopsis: Patient seen by podiatry.  Patient was continued on IV antibiotics.  Suspected mass was confirmed with MRI of the foot.  Podiatry recommended operating room with resection which was performed without complication.  Patient was doing well be following up biopsy results as outpatient.  Patient cleared by podiatry service discharged in good condition    Procedures during hospitalization:   Tumor resection of fourth toe    Lab/Test results  pending at Discharge:   Biopsy results    Consultants:  Podiatry    Discharge Medication List:     Discharge Medications        START taking these medications        Instructions Prescription details   amoxicillin clavulanate 875-125 MG Tabs  Commonly known as: Augmentin      Take 1 tablet by mouth 2 (two) times daily for 10 days.   Stop taking on: April 25, 2024  Quantity: 20 tablet  Refills: 0     HYDROcodone-acetaminophen 5-325 MG Tabs  Commonly known as: Norco  Notes to patient: 1 tab for moderate pain, 2 tabs for severe pain      Take 1-2 tablets by mouth every 4 (four) hours as needed for Pain.   Quantity: 30 tablet  Refills: 0               Where to Get Your Medications        These medications were sent to [id number] - [place], [place] - [address] [facility] [employer] AT [address], [phone], [phone] [address] [facility] [address]      Phone: [phone]   amoxicillin clavulanate 875-125 MG Tabs  HYDROcodone-acetaminophen 5-325 MG Tabs         Follow-up appointment:   Pcp, Unknown    Schedule an appointment as soon as possible for a visit in 1 week(s)      Papo Mcnally DPM  [address] [facility] [address] [phone]    Schedule an appointment as soon as possible for a visit in 2 week(s)        -----------------------------------------------------------------------------------------------  PATIENT DISCHARGE INSTRUCTIONS: See electronic chart    Abdi Albright MD 4/16/2024    Time spent: 33 minutes

## 2024-04-16 NOTE — PAYOR COMM NOTE
--------------  DISCHARGE REVIEW    Payor: [facility]  Subscriber #:  [id number]  Authorization Number: [id number]    Observation 24  Inpatient order 24  Discharge Date: 4/15/2024  3:30 PM     Admitting Physician: Abdi Albright MD  Attending Physician:  No att. providers found  Primary Care Physician: Pcp, Unknown          Discharge Summary Notes        Discharge Summary signed by Abdi Albright MD at 2024  7:01 AM       Author: Abdi Albright MD Specialty: HOSPITALIST, Internal Medicine Author Type: Physician    Filed: 2024  7:01 AM Date of Service: 2024  6:59 AM Status: Signed    : Abdi Albright MD (Physician)           [facility]  DISCHARGE SUMMARY     Tai Zeng Patient Status:  Inpatient    1990 MRN [id number]   Location [facility] 3SW-A Attending No att. providers found   Hosp Day # 1 PCP Unknown Pcp     Date of Admission: 2024  Date of Discharge: 4/15/2024    Discharge Disposition: Home or Self Care    Admitting Diagnosis:   Localized swelling of toe of left foot [R22.42]    Hospital Discharge Diagnoses:  #L foot 4th digit swelling-active bleed, possible cellulitis vs tumor  -Podiatry consulted  -MRI foot with 4th digit masslike enlargement- suspecting giant cell tumor  -empiric abx for now but will consider stopping post op   -OR today doing well      #Hyperglycemia- preDM- insulin protocol for now- diet exercise recommended  # Leukocytosis secondary to above  # Elevated CRP and ESR secondary to above    Lace+ Score: 22  59-90 High Risk  29-58 Medium Risk  0-28   Low Risk.     History of Present Illness: Tai Zeng is a 34 year old male with worsening left tongue pain and swelling and now actively bleeding.  Patient states that his left foot has become mildly swollen over the last couple days.  He states that his left fourth toe has been having swelling and issues for 2 years.  However, became increasingly red and swollen and then  started actively discharging blood which led him to the emergency department.  He denies fevers or chills.  He is prediabetic.  He is on no other medications.  He denies any other complaints at this time.     Brief Synopsis: Patient seen by podiatry.  Patient was continued on IV antibiotics.  Suspected mass was confirmed with MRI of the foot.  Podiatry recommended operating room with resection which was performed without complication.  Patient was doing well be following up biopsy results as outpatient.  Patient cleared by podiatry service discharged in good condition    Procedures during hospitalization:   Tumor resection of fourth toe    Lab/Test results pending at Discharge:   Biopsy results    Consultants:  Podiatry    Discharge Medication List:     Discharge Medications        START taking these medications        Instructions Prescription details   amoxicillin clavulanate 875-125 MG Tabs  Commonly known as: Augmentin      Take 1 tablet by mouth 2 (two) times daily for 10 days.   Stop taking on: April 25, 2024  Quantity: 20 tablet  Refills: 0     HYDROcodone-acetaminophen 5-325 MG Tabs  Commonly known as: Norco  Notes to patient: 1 tab for moderate pain, 2 tabs for severe pain      Take 1-2 tablets by mouth every 4 (four) hours as needed for Pain.   Quantity: 30 tablet  Refills: 0               Where to Get Your Medications        These medications were sent to [id number] - [place], [place] - [address] [facility] AT [address], [phone], [phone]  [address] [facility] [address]      Phone: [phone]   amoxicillin clavulanate 875-125 MG Tabs  HYDROcodone-acetaminophen 5-325 MG Tabs         Follow-up appointment:   Pcp, Unknown    Schedule an appointment as soon as possible for a visit in 1 week(s)      Papo Mcnally DPM  [address] [facility] [address] [phone]    Schedule an appointment as soon as possible for a visit in 2  week(s)        -----------------------------------------------------------------------------------------------  PATIENT DISCHARGE INSTRUCTIONS: See electronic chart    Abdi Albright MD 4/16/2024    Time spent: 33 minutes      Electronically signed by Abdi Albright MD on 4/16/2024  7:01 AM

## 2024-04-16 NOTE — PAYOR COMM NOTE
--------------  ADMISSION REVIEW     Payor: [facility]  Subscriber #:  [id number]  Authorization Number: [id number]    Admit date: 4/14/24  Admit time: 11:54 AM               REVIEW DOCUMENTATION:  Patient Seen in: [facility] Department      History     Chief Complaint   Patient presents with    Wound Care     Stated Complaint: hematoma to 4th digit on left foot.  bleeding, increased last night.    Subjective:   HPI    Patient is a 34-year-old male who presents with bleeding from his left fourth toe since last night.  He states it has been very swollen for a couple of years.  Back when it started he states he was wearing some shoes that were too small for him, developed some swelling and pain there.  At 1 point he saw podiatrist who told him it was a hematoma but it has continued to slowly get progressively bigger ever since.  He has been trying to find the another podiatrist for follow-up but has had trouble getting into 1 due to his insurance.  Last night it spontaneously started bleeding from the tip, he has wrapped it up but states it continued to bleed ever since.  It is painful.  No recent trauma.      Social History     Socioeconomic History    Marital status: Single   Tobacco Use    Smoking status: Every Day     Types: Cigarettes    Smokeless tobacco: Never   Vaping Use    Vaping status: Never Used   Substance and Sexual Activity    Alcohol use: Yes     Comment: occ    Drug use: Yes     Types: Cannabis     Social Determinants of Health     Food Insecurity: Food Insecurity Present (11/30/2023)    Received from [facility]    Hunger Vital Sign     Worried About Running Out of Food in the Last Year: Sometimes true   Transportation Needs: Unknown (11/30/2023)    Received from [facility]    PRAPARE - Transportation     Lack of Transportation (Non-Medical): No   Housing Stability: High Risk (11/30/2023)    Received from [facility]    Housing  Stability Vital Sign     Unable to Pay for Housing in the Last Year: Yes       Review of Systems    Positive for stated complaint: hematoma to 4th digit on left foot.  bleeding, increased last night.  Other systems are as noted in HPI.  Constitutional and vital signs reviewed.      All other systems reviewed and negative except as noted above.    Physical Exam     ED Triage Vitals [04/12/24 0948]   BP (!) 145/98   Pulse 82   Resp 18   Temp 97.2 °F (36.2 °C)   Temp src Temporal   SpO2 99 %   O2 Device None (Room air)       Current:BP (!) 148/92   Pulse 88   Temp 97.2 °F (36.2 °C) (Temporal)   Resp 18   Ht 177.8 cm (5' 10\")   Wt 113.4 kg   SpO2 99%   BMI 35.87 kg/m²         Physical Exam  Vitals and nursing note reviewed.   Constitutional:       Appearance: He is well-developed.   HENT:      Head: Normocephalic and atraumatic.   Eyes:      Conjunctiva/sclera: Conjunctivae normal.      Pupils: Pupils are equal, round, and reactive to light.   Cardiovascular:      Rate and Rhythm: Normal rate and regular rhythm.      Heart sounds: Normal heart sounds.   Pulmonary:      Effort: Pulmonary effort is normal.      Breath sounds: Normal breath sounds.   Abdominal:      General: Bowel sounds are normal.      Palpations: Abdomen is soft.   Musculoskeletal:         General: Normal range of motion.      Comments: The left fourth toe has significant swelling over the distal tip to about 3 times its normal size.  It is not indurated or fluctuant, fairly soft but it is tender.  There is active arterial spurting from a small vessel in a circular open wound at the tip of the toe.  The rest of the toe does have swelling as well although not to the degree that the tip does.   Skin:     General: Skin is warm and dry.   Neurological:      Mental Status: He is alert and oriented to person, place, and time.               ED Course     Labs Reviewed   COMP METABOLIC PANEL (14) - Abnormal; Notable for the following components:        Result Value    Glucose 100 (*)     AST 13 (*)     A/G Ratio 0.9 (*)     All other components within normal limits   SED RATE, WESTERGREN (AUTOMATED) - Abnormal; Notable for the following components:    Sed Rate 52 (*)     All other components within normal limits   C-REACTIVE PROTEIN - Abnormal; Notable for the following components:    C-Reactive Protein 1.26 (*)     All other components within normal limits   HEMOGLOBIN A1C - Abnormal; Notable for the following components:    HgbA1C 6.4 (*)     Estimated Average Glucose 137 (*)     All other components within normal limits   CBC W/ DIFFERENTIAL - Abnormal; Notable for the following components:    WBC 11.4 (*)     HGB 11.4 (*)     HCT 35.7 (*)     MCV 78.3 (*)     MCH 25.0 (*)     All other components within normal limits              XR FOOT, COMPLETE (MIN 3 VIEWS), LEFT (CPT=73630)    Result Date: 4/12/2024  PROCEDURE:  XR FOOT, COMPLETE (MIN 3 VIEWS), LEFT (CPT=73630)  TECHNIQUE:  AP, oblique, and lateral views were obtained.  COMPARISON:  None.  INDICATIONS:  hematoma to 4th digit on left foot.  bleeding, increased last night.  PATIENT STATED HISTORY: (As transcribed by Technologist)  Patient's states that his left foot, his toes in particular, began to swell up about a year ago and now they are bleeding. He didn't have any particular injury. He believes the swelling may have started due to wearing the wrong size shoes.    FINDINGS:  BONES:  Osseous structures are intact.  Joint spaces are preserved.  No significant arthropathy.  Mild hallux valgus deformity.  No dislocation. SOFT TISSUES:  Marked soft tissue swelling surrounds and distal to the 4th distal phalanx.            CONCLUSION:  1. Marked masslike soft tissue swelling surrounds the 4th distal phalanx most likely represents patient's known hematoma given the history.  If osteomyelitis is of high clinical concern, recommend MRI for further evaluation.   LOCATION:  [place]   Dictated by (CST): Ciara Jaquez  MD on 4/12/2024 at 10:58 AM     Finalized by (CST): Ciara Jaquez MD on 4/12/2024 at 11:01 AM          MDM      Patient is a 34-year-old male presenting with bleeding from the tip of his left fourth toe.  He states he has had swelling there that has been slowly progressing for couple of years.  It is painful.  No recent or remote trauma, it started after he wore a pair of shoes that was too tight.  Last night it spontaneously started bleeding and has continued to bleed overnight.  He has it wrapped up and heavily taped but I can visualize a lot of clot around the lateral side of his distal foot.  Upon removal there is significant soft tissue swelling to the tip of the left fourth toe.  Once overlying hematoma was removed there is active arterial spurting from a small vessel in the middle of an open wound at the tip of his toe.  I quickly did a digital block with lidocaine without epinephrine and placed two 4-0 nylon sutures over this open wound and does appear to have hemostasis right now.  Unclear if this is a hematoma although he states has been slowly building up for a couple of years and would not expect just a benign hematoma to do that.  He has a history of prediabetes although admits he does not go to the doctor regularly so it may be that he is diabetic and this is an osteomyelitis or underlying infection.  Labs including sed rate and CRP ordered along with an x-ray.  Admission disposition: 4/12/2024 12:41 PM           Update at 12:35 PM.  X-ray as above with significant soft tissue swelling at the tip of the toe but no obvious bony abnormality, no fracture or definite osteomyelitis.  Mild leukocytosis inflammatory markers are mildly elevated as well.  Etiology is unclear.  No further bleeding upon repeat examination.  I discussed case with Dr. Mcnally of podiatry who is able to review the x-ray.  He agrees there is marked soft tissue swelling there of unclear etiology.  He does agree that would be reasonable to  admit the patient least tonight for further investigation and further evaluation by him.  May need additional imaging including MRI.  Discussed case with Dr. Albright the hospitalist today.  He does agree that it is reasonable to cover patient with a dose of antibiotics.        Medications Provided: Ancef    Disposition:      Medical Decision Making      Disposition and Plan     Clinical Impression:  1. Localized swelling of toe of left foot         Disposition:  Admit  2024 12:41 pm      [facility]     Tai Zeng Patient Status:  Emergency    1990 MRN [id number]   Location [facility] EMERGENCY DEPARTMENT Attending Guerrero Silva MD   Hosp Day # 0 PCP Unknown Pcp     Chief Complaint: toe bleeding     Subjective:    History of Present Illness:   Tai Zeng is a 34 year old male with worsening left tongue pain and swelling and now actively bleeding.  Patient states that his left foot has become mildly swollen over the last couple days.  He states that his left fourth toe has been having swelling and issues for 2 years.  However, became increasingly red and swollen and then started actively discharging blood which led him to the emergency department.  He denies fevers or chills.  He is prediabetic.  He is on no other medications.  He denies any other complaints at this time.    History/Other:    Past Medical History:  Past Medical History:    Anemia    Diabetes (HCC)     Past Surgical History:   History reviewed. No pertinent surgical history.   Family History:   No family history on file.  Social History:    reports that he has been smoking cigarettes. He has never used smokeless tobacco. He reports current alcohol use. He reports current drug use. Drug: Cannabis.        Hospitalist      Assessment & Plan:  #L foot 4th digit swelling-active bleed, possible cellulitis vs tumor  -Podiatry consulted  -MRI foot with 4th digit masslike enlargement-  suspecting giant cell tumor  -empiric abx for now but will consider stopping post op   -OR timing per Dr. Arevalo - tomorrow      4/14 Podiatry     erm: Large fluid-filled mass noted distal aspect of left fourth toe and to lesser extent left fifth toe.  Site is movable and tender to touch.  Sanguinous drainage noted with sutures intact to site.  No warmth or other concerns.  See images above.     Vascular: Pedal pulses easily palpable.     Neuro: Gross sensation intact to digits.    MSK: Large soft tissue mass noted distal aspect left fourth toe to lesser extent left fifth toe.  Mild pain noted to sites.  Compartments are soft compressible.  Strength testing deferred.     Imaging:  MRI FOOT (W+WO), LEFT (CPT=73720)     Result Date: 4/13/2024  CONCLUSION:  Marked masslike enlargement surrounding the 4th digit without bony destruction or joint effusion.  This region demonstrates high T2 low T1 signal with marked enhancement.  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  Other malignancy is of consideration but less likely.  A mildly hemorrhagic mass is of consideration.  There are small foci of hemorrhage within this markedly enhancing mass.   LOCATION:  [place]   Dictated by (CST): Flavio Vargas MD on 4/13/2024 at 12:38 PM     Finalized by (CST): Flavio Vargas MD on 4/13/2024 at 1:04 PM        XR FOOT, COMPLETE (MIN 3 VIEWS), LEFT (CPT=73630)       Localized swelling of toe of left foot     Leukocytosis    Elevated C-reactive protein (CRP)    ESR raised    Mass of soft tissue of foot         -Patient examined, chart history reviewed.  -Patient is afebrile, white blood cell 11.4.  -CRP 1.26, ESR 52.  -X-rays reviewed.  Large soft tissue swelling at distal fourth toe.  No erosions or other concerns appreciated.  -MRI reviewed--possible giant cell tumor to fourth toe.  -Discussed with patient that it appears he has a vascular tumor distal aspect of  left fourth toe.  It does appear that the mass in his fourth toes causing pain in his fifth toe from rubbing on site.  Does not appear to be secondary mass of the toe at this time.   -Discussed treatment options at length with patient.  I would recommend attempt at soft tissue mass excision at this time.  Discussed with patient there is a chance he may require partial or complete toe amputation.  Will try and resect the mass without amputated toe at this time.     Surgery was discussed in great detail including benefits, risks, recovery period.  Risks include, but are not limited to, recurrence, infection, pain, deformity, need for revision surgery, loss of digit, and need for prolonged wound care.  If site does recur patient may also require partial toe amputation in future.  He is also being consulted for partial toe potation at this time even though we will try and resect mass without removing part of the digit.     All treatment options have been discussed with the patient including both conservative and surgical attempts at correction. Potential risks and complications of surgical intervention were discussed at length which including but not not limited to death, loss of limb, post op pain, swelling, infection, bleeding, reoccurrence of the deformity, extended healing, and the possibility of further and future surgery.  No guarantees have been made to the patient and the informed consent has been signed.      Surgery has been scheduled for tomorrow at 9 AM.  Please have patient n.p.o. at midnight.      4/15    re-Operative Diagnosis: Painful soft tissue mass, left 4th toe     Post-Operative Diagnosis: Same     Procedure Performed:   Soft tissue mass excision, left 4th toe     Surgeons and Role:     * Papo Mcnally DPM - Primary     Assistant(s):   none     Surgical Findings: Soft tissue mass appeared to arise from flexor tendon of toe     Specimen: Soft tissue mass and redundant skin, left 4th toe--path      Estimated Blood Loss: 5cc     EXCISION SOFT TISSUE MASS LEFT 4TH.  TOE POSS. LEFT TOE  AMPUTATION     Procedure Summary         Date: 04/15/24 Room / Location:  MAIN OR 04 / [facility] MAIN OR     Anesthesia Start: 1007 Anesthesia Stop: 1108     Procedure: EXCISION SOFT TISSUE MASS LEFT 4TH.  TOE POSS. LEFT TOE  AMPUTATION (Left: Toe) Diagnosis:       Osteomyelitis (HCC)      (Osteomyelitis (HCC) [M86.9])     Surgeons: Papo Mcnally DPM Anesthesiologist: Kirby Badillo MD     Anesthesia Type: general ASA Status: 2                4/15 Hospitalist    Assessment & Plan:  #L foot 4th digit swelling-active bleed, possible cellulitis vs tumor  -Podiatry consulted  -MRI foot with 4th digit masslike enlargement- suspecting giant cell tumor  -empiric abx for now but will consider stopping post op   -OR today doing well      #Hyperglycemia- preDM- insulin protocol for now- diet exercise recommended  # Leukocytosis secondary to above  # Elevated CRP and ESR secondary to above       Date of Admission: 4/12/2024  Date of Discharge: 4/15/2024    Discharge Disposition: Home or Self Care     Admitting Diagnosis:   Localized swelling of toe of left foot [R22.42]     Hospital Discharge Diagnoses:  #L foot 4th digit swelling-active bleed, possible cellulitis vs tumor  -Podiatry consulted  -MRI foot with 4th digit masslike enlargement- suspecting giant cell tumor  -empiric abx for now but will consider stopping post op   -OR today doing well      #Hyperglycemia- preDM- insulin protocol for now- diet exercise recommended  # Leukocytosis secondary to above  # Elevated CRP and ESR secondary to above     Lace+ Score: 22  59-90 High Risk  29-58 Medium Risk  0-28   Low Risk.      History of Present Illness: Tai Zeng is a 34 year old male with worsening left tongue pain and swelling and now actively bleeding.  Patient states that his left foot has become mildly swollen over the last couple days.  He states that his left fourth  toe has been having swelling and issues for 2 years.  However, became increasingly red and swollen and then started actively discharging blood which led him to the emergency department.  He denies fevers or chills.  He is prediabetic.  He is on no other medications.  He denies any other complaints at this time.      Brief Synopsis: Patient seen by podiatry.  Patient was continued on IV antibiotics.  Suspected mass was confirmed with MRI of the foot.  Podiatry recommended operating room with resection which was performed without complication.  Patient was doing well be following up biopsy results as outpatient.  Patient cleared by podiatry service discharged in good condition                                                  Latest Reference Range & Units 04/12/24 10:28   SED RATE 0 - 15 mm/Hr 52 (H)   (H): Data is abnormally high     Latest Reference Range & Units 04/12/24 10:28   C-REACTIVE PROTEIN <0.30 mg/dL 1.26 (H)   (H): Data is abnormally high    MEDICATIONS ADMINISTERED IN LAST 1 DAY:          Vitals (last day) before discharge       Date/Time Temp Pulse Resp BP SpO2 Weight O2 Device O2 Flow Rate (L/min) [facility]    04/15/24 1221 98.6 °F (37 °C) 66 18 118/78 95 % -- -- --     04/15/24 1130 -- 72 17 123/81 94 % -- -- --     04/15/24 1115 -- 77 16 120/72 96 % -- None (Room air) --     04/15/24 1110 -- 76 14 120/77 94 % -- -- --     04/15/24 1105 -- 83 10 133/90 95 % -- -- --     04/15/24 1100 99.4 °F (37.4 °C) 96 16 137/84 99 % -- None (Room air) --     04/15/24 0847 98.5 °F (36.9 °C) 83 17 121/84 99 % -- None (Room air) --     04/15/24 0500 98.6 °F (37 °C) 78 18 133/78 100 % -- None (Room air) --     04/14/24 2000 98.8 °F (37.1 °C) 82 18 134/92 98 % -- None (Room air) --     04/14/24 1546 99.1 °F (37.3 °C) 65 18 131/75 98 % -- None (Room air) --     04/14/24 0803 98 °F (36.7 °C) 71 18 132/69 100 % -- None (Room air) --     04/14/24 0349 98.8 °F (37.1 °C) 76 18 139/83 100 % -- None (Room air)  -- JR

## 2024-04-16 NOTE — TELEPHONE ENCOUNTER
Patient had surgery yesterday with Dr Mcnally and would like to speak to a nurse has questions would like to know if is normal to have blood from the surgery area.Please advise

## 2024-04-26 ENCOUNTER — OFFICE VISIT (OUTPATIENT)
Dept: PODIATRY CLINIC | Facility: CLINIC | Age: 34
End: 2024-04-26

## 2024-04-26 DIAGNOSIS — M79.89 MASS OF SOFT TISSUE OF FOOT: Primary | ICD-10-CM

## 2024-04-26 DIAGNOSIS — Z47.89 ORTHOPEDIC AFTERCARE: ICD-10-CM

## 2024-04-26 PROCEDURE — 99024 POSTOP FOLLOW-UP VISIT: CPT | Performed by: STUDENT IN AN ORGANIZED HEALTH CARE EDUCATION/TRAINING PROGRAM

## 2024-04-26 RX ORDER — HYDROCODONE BITARTRATE AND ACETAMINOPHEN 5; 325 MG/1; MG/1
1 TABLET ORAL EVERY 6 HOURS PRN
Qty: 20 TABLET | Refills: 0 | Status: SHIPPED | OUTPATIENT
Start: 2024-04-26

## 2024-04-26 NOTE — PROGRESS NOTES
[facility] Podiatry  Progress Note    Tai Zeng is a 34 year old male.   Chief Complaint   Patient presents with    Post-Op     Post op left foot- pain 2/10- intermittent tingling with positioning           HPI:     Patient is a pleasant 34-year-old male who presents to clinic for follow-up status post soft tissue mass excision left fourth toe.  He has been he rates it a 2 out of 10.  He does admit to being very active since discharge.  He does have a little dried blood on his bandage.  He presents today for reevaluation.      Allergies: Patient has no known allergies.   Current Outpatient Medications   Medication Sig Dispense Refill    HYDROcodone-acetaminophen (NORCO) 5-325 MG Oral Tab Take 1 tablet by mouth every 6 (six) hours as needed for Pain. 20 tablet 0    HYDROcodone-acetaminophen 5-325 MG Oral Tab Take 1-2 tablets by mouth every 4 (four) hours as needed for Pain. 30 tablet 0      Past Medical History:    Anemia    Diabetes (HCC)    High blood pressure      No past surgical history on file.   No family history on file.   Social History     Socioeconomic History    Marital status: Single   Tobacco Use    Smoking status: Every Day     Types: Cigarettes    Smokeless tobacco: Never   Vaping Use    Vaping status: Never Used   Substance and Sexual Activity    Alcohol use: Yes     Comment: occ    Drug use: Yes     Types: Cannabis           REVIEW OF SYSTEMS:     Denies nausea, vomiting, fever, chills.      EXAM:   There were no vitals taken for this visit.  GENERAL: well developed, well nourished, in no apparent distress  EXTREMITIES:   1. Integument: Normal skin temperature and turgor.  Incisions well coapted sutures intact.  There is a lot of dried blood/eschar over plantar incision.  2. Vascular: Dorsalis pedis two out of four bilateral and posterior tibial pulses two out of   four bilateral, capillary refill normal.   3. Musculoskeletal: All muscle groups are graded 5 out of 5 in the foot and  ankle.  Status post soft tissue mass excision left fourth toe.  Minor pain to fifth toe where there may be small soft tissue mass as well.   4. Neurological: Normal sharp dull sensation; reflexes normal.          ASSESSMENT AND PLAN:   Diagnoses and all orders for this visit:    Mass of soft tissue of foot  -     HYDROcodone-acetaminophen (NORCO) 5-325 MG Oral Tab; Take 1 tablet by mouth every 6 (six) hours as needed for Pain.    Orthopedic aftercare  -     HYDROcodone-acetaminophen (NORCO) 5-325 MG Oral Tab; Take 1 tablet by mouth every 6 (six) hours as needed for Pain.        Plan:    -Status post left fourth toe soft tissue mass excision.  -Site inspected.  Noted to be healing well without concerns.  There is a lot of dried blood/eschar.  Patient has been a little too active this week.  Site was cleansed and dressed with Betadine and dry dressing.  Should limit activity and stay in postop shoe for the next week.  -Pathology confirms glomus tumor with hemorrhage.  -Educated patient acute signs of infection advised patient to seek immediate medical attention any concerns arise.  -RTC 1 week.    The patient indicates understanding of these issues and agrees to the plan.        Papo Mcnally DPM    Dragon speech recognition software was used to prepare this note.  Errors in word recognition may occur.  Please contact me with any questions/concerns with this note.

## 2024-05-02 ENCOUNTER — OFFICE VISIT (OUTPATIENT)
Dept: PODIATRY CLINIC | Facility: CLINIC | Age: 34
End: 2024-05-02

## 2024-05-02 DIAGNOSIS — Z47.89 ORTHOPEDIC AFTERCARE: ICD-10-CM

## 2024-05-02 DIAGNOSIS — M79.89 MASS OF SOFT TISSUE OF FOOT: Primary | ICD-10-CM

## 2024-05-02 PROCEDURE — 99024 POSTOP FOLLOW-UP VISIT: CPT | Performed by: STUDENT IN AN ORGANIZED HEALTH CARE EDUCATION/TRAINING PROGRAM

## 2024-05-05 NOTE — PROGRESS NOTES
[facility] Podiatry  Progress Note    Tai Zeng is a 34 year old male.   No chief complaint on file.        HPI:     Patient is a pleasant 34-year-old male who presents to clinic for follow-up status post soft tissue mass excision left fourth toe.  He relates that he is doing well most without pain at this time.  He has been ambulating with postop shoe.  He presents today for evaluation.  No other complaints are mentioned.      Allergies: Patient has no known allergies.   Current Outpatient Medications   Medication Sig Dispense Refill    HYDROcodone-acetaminophen (NORCO) 5-325 MG Oral Tab Take 1 tablet by mouth every 6 (six) hours as needed for Pain. 20 tablet 0    HYDROcodone-acetaminophen 5-325 MG Oral Tab Take 1-2 tablets by mouth every 4 (four) hours as needed for Pain. 30 tablet 0      Past Medical History:    Anemia    Diabetes (HCC)    High blood pressure      History reviewed. No pertinent surgical history.   History reviewed. No pertinent family history.   Social History     Socioeconomic History    Marital status: Single   Tobacco Use    Smoking status: Every Day     Types: Cigarettes    Smokeless tobacco: Never   Vaping Use    Vaping status: Never Used   Substance and Sexual Activity    Alcohol use: Yes     Comment: occ    Drug use: Yes     Types: Cannabis           REVIEW OF SYSTEMS:     Denies nausea, vomiting, fever, chills.      EXAM:   There were no vitals taken for this visit.  GENERAL: well developed, well nourished, in no apparent distress  EXTREMITIES:   1. Integument: Normal skin temperature and turgor.  Incisions well coapted sutures intact.  There is a lot of dried blood/eschar over plantar incision. No acute SOI or other concerns.  2. Vascular: Dorsalis pedis two out of four bilateral and posterior tibial pulses two out of   four bilateral, capillary refill normal.   3. Musculoskeletal: All muscle groups are graded 5 out of 5 in the foot and ankle.  Status post soft tissue mass  excision left fourth toe.  Minor pain to fifth toe where there may be small soft tissue mass as well.   4. Neurological: Normal sharp dull sensation; reflexes normal.          ASSESSMENT AND PLAN:   Diagnoses and all orders for this visit:    Mass of soft tissue of foot    Orthopedic aftercare        Plan:    -Status post left fourth toe soft tissue mass excision.  -Site inspected.  Noted to be healing well without concerns.  There is a lot of dried blood/eschar.  Sutures removed sterile suture removal kit without incident.  Eschar lifted off without incident.  Site is mostly well-healed but has a little skin breakdown plantarly.  Will have patient dress with bacitracin and Band-Aid over the next week.   -Can ambulate in postop shoe or supportive tennis shoes.  -Patient monitor site regularly and seek immediate medical attention for any skin breakdown or acute signs infection arise.  -Pathology confirms glomus tumor with hemorrhage.  -Patient still does have a little pain to his left fifth toe where there may be start of small soft tissue mass.  Will monitor the site.  -RTC 2 weeks.    The patient indicates understanding of these issues and agrees to the plan.        Papo Mcnally DPM    Dragon speech recognition software was used to prepare this note.  Errors in word recognition may occur.  Please contact me with any questions/concerns with this note.

## 2024-05-16 ENCOUNTER — OFFICE VISIT (OUTPATIENT)
Dept: PODIATRY CLINIC | Facility: CLINIC | Age: 34
End: 2024-05-16

## 2024-05-16 DIAGNOSIS — D18.00 GLOMUS TUMOR: Primary | ICD-10-CM

## 2024-05-16 DIAGNOSIS — Z47.89 ORTHOPEDIC AFTERCARE: ICD-10-CM

## 2024-05-16 DIAGNOSIS — M79.89 MASS OF SOFT TISSUE OF FOOT: ICD-10-CM

## 2024-05-17 RX ORDER — CEPHALEXIN 500 MG/1
500 CAPSULE ORAL 2 TIMES DAILY
Qty: 14 CAPSULE | Refills: 0 | Status: SHIPPED | OUTPATIENT
Start: 2024-05-17

## 2024-05-20 ENCOUNTER — TELEPHONE (OUTPATIENT)
Dept: PODIATRY CLINIC | Facility: CLINIC | Age: 34
End: 2024-05-20

## 2024-05-20 DIAGNOSIS — M79.89 MASS OF SOFT TISSUE OF FOOT: ICD-10-CM

## 2024-05-20 DIAGNOSIS — D18.00 GLOMUS TUMOR: Primary | ICD-10-CM

## 2024-05-20 NOTE — PROGRESS NOTES
[facility] Podiatry  Progress Note    Tai Zeng is a 34 year old male.   Chief Complaint   Patient presents with    Post-Op     Left foot- rates pain 3/10- stitches are still there- 5th toe is painful .          HPI:     Patient is a pleasant 34-year-old male who presents to clinic for follow-up status post soft tissue mass excision left fourth toe.  He relates that he is doing well most without pain at this time.  He has been ambulating with postop shoe.  He presents today for evaluation.  No other complaints are mentioned.      Allergies: Patient has no known allergies.   Current Outpatient Medications   Medication Sig Dispense Refill    cephalexin (KEFLEX) 500 MG Oral Cap Take 1 capsule (500 mg total) by mouth 2 (two) times daily. 14 capsule 0    HYDROcodone-acetaminophen (NORCO) 5-325 MG Oral Tab Take 1 tablet by mouth every 6 (six) hours as needed for Pain. 20 tablet 0    HYDROcodone-acetaminophen 5-325 MG Oral Tab Take 1-2 tablets by mouth every 4 (four) hours as needed for Pain. 30 tablet 0      Past Medical History:    Anemia    Diabetes (HCC)    High blood pressure      History reviewed. No pertinent surgical history.   History reviewed. No pertinent family history.   Social History     Socioeconomic History    Marital status: Single   Tobacco Use    Smoking status: Every Day     Types: Cigarettes    Smokeless tobacco: Never   Vaping Use    Vaping status: Never Used   Substance and Sexual Activity    Alcohol use: Yes     Comment: occ    Drug use: Yes     Types: Cannabis           REVIEW OF SYSTEMS:     Denies nausea, vomiting, fever, chills.      EXAM:   There were no vitals taken for this visit.  GENERAL: well developed, well nourished, in no apparent distress  EXTREMITIES:       1. Integument: Normal skin temperature and turgor.  Soft tissue mass excision of left fourth toe is well-healed.  There is small soft tissue mass to the distal aspect of fifth toe that has not resolved on its own  since resection of fourth toe soft tissue mass.  Pain noted to site.  2. Vascular: Dorsalis pedis two out of four bilateral and posterior tibial pulses two out of   four bilateral, capillary refill normal.   3. Musculoskeletal: All muscle groups are graded 5 out of 5 in the foot and ankle.  Status post soft tissue mass excision left fourth toe.  Successful healing noted.  Minor pain to fifth toe where there may be small soft tissue mass as well.   4. Neurological: Normal sharp dull sensation; reflexes normal.          ASSESSMENT AND PLAN:   Diagnoses and all orders for this visit:    Glomus tumor    Mass of soft tissue of foot  -     cephalexin (KEFLEX) 500 MG Oral Cap; Take 1 capsule (500 mg total) by mouth 2 (two) times daily.    Orthopedic aftercare        Plan:    -Status post left fourth toe soft tissue mass excision.  -This site is well-healed.  No recurrence of mass visualized.  1 remaining suture removed without incident.  -Patient is no longer with pain to his left fourth toe site and doing very well with respect to this.  He does have a small soft tissue mass to the distal aspect of his fifth toe that is much smaller than the mass to his fourth toe.  However, it does appear to be persistent.  We are hoping this would resolve on its own once rubbing from fourth toe was taking care of.  Because it is still bothering patient, can schedule for soft tissue mass excision of his fifth toe as well.  -Discussed with patient that is likely this is a small glomus tumor as well.  Discussed excision including benefits, risks, recovery period.    All treatment options have been discussed with the patient including both conservative and surgical attempts at correction. Potential risks and complications of surgical intervention were discussed at length which including but not not limited to death, loss of limb, post op pain, swelling, infection, bleeding, reoccurrence of the deformity, extended healing, and the  possibility of further and future surgery.  No guarantees have been made to the patient and the informed consent has been signed.   Because soft tissue mass sites are very significant size, discussed with patient that if recurrence occurs he may require partial toe amputation.  However, fourth toe appears very well-healed at this time and fifth toe soft tissue mass is much smaller.  Will have our  reach out to schedule surgery.  All questions were answered to satisfaction.     The patient indicates understanding of these issues and agrees to the plan.        Papo Mcnally DPM    Dragon speech recognition software was used to prepare this note.  Errors in word recognition may occur.  Please contact me with any questions/concerns with this note.

## 2024-05-20 NOTE — TELEPHONE ENCOUNTER
Procedure: soft tissue mass excision, left 5th toe  CPT code: 68781  Length of Surgery: 45 minutes  Any Instruments: podiatry tray   Call patient: ASAP  Anesthesia: MAC  Location: [facility]  Assistance: none  Pacemaker: No  Anticoagulants: No  Nickel Allergy: No  Latex Allergy: No  Diagnosis/ICD Code:     ICD-10-CM    1. Glomus tumor  D18.00       2. Mass of soft tissue of foot  M79.89

## 2024-05-22 DIAGNOSIS — M79.89 MASS OF SOFT TISSUE OF FOOT: ICD-10-CM

## 2024-05-22 DIAGNOSIS — D18.00 GLOMUS TUMOR: Primary | ICD-10-CM

## 2024-08-15 DIAGNOSIS — D18.00 GLOMUS TUMOR: Primary | ICD-10-CM

## 2024-08-15 DIAGNOSIS — M79.89 MASS OF SOFT TISSUE OF FOOT: ICD-10-CM

## 2024-12-11 ENCOUNTER — OFFICE VISIT (OUTPATIENT)
Dept: PODIATRY CLINIC | Facility: CLINIC | Age: 34
End: 2024-12-11

## 2024-12-11 DIAGNOSIS — M79.89 MASS OF SOFT TISSUE OF FOOT: ICD-10-CM

## 2024-12-11 DIAGNOSIS — M79.675 TOE PAIN, LEFT: ICD-10-CM

## 2024-12-11 DIAGNOSIS — D18.00 GLOMUS TUMOR: Primary | ICD-10-CM

## 2024-12-11 PROCEDURE — 99213 OFFICE O/P EST LOW 20 MIN: CPT | Performed by: STUDENT IN AN ORGANIZED HEALTH CARE EDUCATION/TRAINING PROGRAM

## 2024-12-15 NOTE — PROGRESS NOTES
[facility] Podiatry  Progress Note    Tai Zeng is a 34 year old male.   Chief Complaint   Patient presents with    Follow - Up     Requesting surgery left foot - pain 7/10         HPI:     Patient is a pleasant 34-year-old male who presents to clinic for follow-up status post soft tissue mass excision left fourth toe.  His prior pathology showed glomus tumor with hemorrhage.  His fourth toe is doing pretty well and does not appear to have any regrowth.  He does get some tenderness to site.  However, he does have similar lesion that is growing to his fifth toe that he would like excised.  He has pain that he rates at a 7 out of 10.  He was scheduled to have this completed previously but was unable to make it to his surgery the date was scheduled.  He presents today to try and reschedule surgery further discuss options. No other complaints are mentioned.      Allergies: Patient has no known allergies.   Current Outpatient Medications   Medication Sig Dispense Refill    cephalexin (KEFLEX) 500 MG Oral Cap Take 1 capsule (500 mg total) by mouth 2 (two) times daily. 14 capsule 0    HYDROcodone-acetaminophen (NORCO) 5-325 MG Oral Tab Take 1 tablet by mouth every 6 (six) hours as needed for Pain. 20 tablet 0    HYDROcodone-acetaminophen 5-325 MG Oral Tab Take 1-2 tablets by mouth every 4 (four) hours as needed for Pain. 30 tablet 0      Past Medical History:    Anemia    Diabetes (HCC)    High blood pressure    Prediabetes      Past Surgical History:   Procedure Laterality Date    Other surgical history  06/2024    Foot surgery      History reviewed. No pertinent family history.   Social History     Socioeconomic History    Marital status: Single   Tobacco Use    Smoking status: Every Day     Current packs/day: 0.50     Average packs/day: 0.5 packs/day for 17.0 years (8.5 ttl pk-yrs)     Types: Cigarettes     Start date: 2008    Smokeless tobacco: Never   Vaping Use    Vaping status: Never Used   Substance  and Sexual Activity    Alcohol use: Yes     Comment: occ    Drug use: Yes     Types: Cannabis           REVIEW OF SYSTEMS:     Denies nausea, vomiting, fever, chills.      EXAM:   There were no vitals taken for this visit.  GENERAL: well developed, well nourished, in no apparent distress  EXTREMITIES:       1. Integument: Normal skin temperature and turgor.  Soft tissue mass excision of left fourth toe is well-healed.  There is small soft tissue mass to the distal aspect of fifth toe that has not resolved on its own since resection of fourth toe soft tissue mass.  Site seems to be increasing in size.  Pain noted to site.  2. Vascular: Dorsalis pedis two out of four bilateral and posterior tibial pulses two out of   four bilateral, capillary refill normal.   3. Musculoskeletal: All muscle groups are graded 5 out of 5 in the foot and ankle.  Status post soft tissue mass excision left fourth toe.  Successful healing noted.  Minor pain to fifth toe where new soft tissue masses forming.  Minor tenderness to plantar fourth toe as well.   4. Neurological: Normal sharp dull sensation; reflexes normal.          ASSESSMENT AND PLAN:   Diagnoses and all orders for this visit:    Glomus tumor    Mass of soft tissue of foot    Toe pain, left        Plan:    -Status post left fourth toe soft tissue mass excision.  -This site is well-healed.  No recurrence of mass visualized.  There is some tenderness to fourth toe but no obvious recurrence noted.  -Patient's fifth toe soft tissue mass site continues to increase in size and is causing a lot of pain.  -His prior pathology showed glomus tumor with hemorrhage to his fourth toe.  -Will plan for soft tissue mass resection to fifth toe as well as biopsy to plantar fourth toe to ensure mass is fully resolved.  -Procedures discussed in great detail including benefits, risks, recovery period.    All treatment options have been discussed with the patient including both conservative and  surgical attempts at correction. Potential risks and complications of surgical intervention were discussed at length which including but not not limited to death, loss of limb, post op pain, swelling, infection, bleeding, reoccurrence of the deformity, extended healing, and the possibility of further and future surgery.  No guarantees have been made to the patient and the informed consent has been signed.   Because soft tissue mass sites are very significant size, discussed with patient that if recurrence occurs he may require partial toe amputation.  However, fourth toe appears very well-healed at this time and fifth toe soft tissue mass is much smaller.  Will have our  reach out to schedule surgery.  All questions were answered to satisfaction.     The patient indicates understanding of these issues and agrees to the plan.        TRIXIE Coppola speech recognition software was used to prepare this note.  Errors in word recognition may occur.  Please contact me with any questions/concerns with this note.

## 2025-01-20 ENCOUNTER — TELEPHONE (OUTPATIENT)
Dept: PODIATRY CLINIC | Facility: CLINIC | Age: 35
End: 2025-01-20

## 2025-01-21 DIAGNOSIS — D18.00 GLOMUS TUMOR: Primary | ICD-10-CM

## 2025-01-21 DIAGNOSIS — M79.89 MASS OF SOFT TISSUE OF FOOT: ICD-10-CM

## 2025-01-23 PROBLEM — F17.200 SMOKER: Status: ACTIVE | Noted: 2020-12-16

## 2025-01-23 PROBLEM — J30.1 ALLERGIC RHINITIS DUE TO POLLEN: Status: ACTIVE | Noted: 2022-05-20

## 2025-01-23 PROBLEM — R03.0 ELEVATED BLOOD-PRESSURE READING WITHOUT DIAGNOSIS OF HYPERTENSION: Status: ACTIVE | Noted: 2022-05-20

## 2025-01-23 PROBLEM — R51.9 HEADACHE: Status: ACTIVE | Noted: 2022-05-20

## 2025-01-23 PROBLEM — K92.1 HEMATOCHEZIA: Status: ACTIVE | Noted: 2023-09-27

## 2025-01-23 PROBLEM — D64.9 ANEMIA: Status: ACTIVE | Noted: 2023-11-29

## 2025-01-23 PROBLEM — K59.00 CONSTIPATION: Status: ACTIVE | Noted: 2023-11-29

## 2025-01-23 PROBLEM — E66.01 MORBID OBESITY (HCC): Status: ACTIVE | Noted: 2017-12-05

## 2025-01-23 PROBLEM — E78.5 HYPERLIPIDEMIA: Status: ACTIVE | Noted: 2022-05-20

## 2025-01-23 PROBLEM — K52.9 COLITIS: Status: ACTIVE | Noted: 2023-09-27

## 2025-01-23 RX ORDER — ATORVASTATIN CALCIUM 10 MG/1
10 TABLET, FILM COATED ORAL DAILY
COMMUNITY
Start: 2024-08-23

## 2025-02-07 ENCOUNTER — HOSPITAL ENCOUNTER (OUTPATIENT)
Facility: HOSPITAL | Age: 35
Setting detail: HOSPITAL OUTPATIENT SURGERY
Discharge: HOME | End: 2025-02-07
Attending: STUDENT IN AN ORGANIZED HEALTH CARE EDUCATION/TRAINING PROGRAM | Admitting: STUDENT IN AN ORGANIZED HEALTH CARE EDUCATION/TRAINING PROGRAM
Payer: MEDICAID

## 2025-02-07 ENCOUNTER — ANESTHESIA EVENT (OUTPATIENT)
Dept: SURGERY | Facility: HOSPITAL | Age: 35
End: 2025-02-07
Payer: MEDICAID

## 2025-02-07 ENCOUNTER — ANESTHESIA (OUTPATIENT)
Dept: SURGERY | Facility: HOSPITAL | Age: 35
End: 2025-02-07
Payer: MEDICAID

## 2025-02-07 VITALS
HEART RATE: 69 BPM | DIASTOLIC BLOOD PRESSURE: 89 MMHG | RESPIRATION RATE: 18 BRPM | OXYGEN SATURATION: 99 % | SYSTOLIC BLOOD PRESSURE: 122 MMHG | TEMPERATURE: 98 F | HEIGHT: 70 IN | WEIGHT: 267 LBS | BODY MASS INDEX: 38.22 KG/M2

## 2025-02-07 DIAGNOSIS — D18.00 GLOMUS TUMOR: ICD-10-CM

## 2025-02-07 DIAGNOSIS — M79.89 MASS OF SOFT TISSUE OF FOOT: ICD-10-CM

## 2025-02-07 PROCEDURE — 88305 TISSUE EXAM BY PATHOLOGIST: CPT | Performed by: STUDENT IN AN ORGANIZED HEALTH CARE EDUCATION/TRAINING PROGRAM

## 2025-02-07 PROCEDURE — 88304 TISSUE EXAM BY PATHOLOGIST: CPT | Performed by: STUDENT IN AN ORGANIZED HEALTH CARE EDUCATION/TRAINING PROGRAM

## 2025-02-07 PROCEDURE — 0JBR0ZZ EXCISION OF LEFT FOOT SUBCUTANEOUS TISSUE AND FASCIA, OPEN APPROACH: ICD-10-PCS | Performed by: STUDENT IN AN ORGANIZED HEALTH CARE EDUCATION/TRAINING PROGRAM

## 2025-02-07 RX ORDER — ACETAMINOPHEN 500 MG
1000 TABLET ORAL ONCE
Status: DISCONTINUED | OUTPATIENT
Start: 2025-02-07 | End: 2025-02-07 | Stop reason: HOSPADM

## 2025-02-07 RX ORDER — SCOPOLAMINE 1 MG/3D
1 PATCH, EXTENDED RELEASE TRANSDERMAL ONCE
Status: DISCONTINUED | OUTPATIENT
Start: 2025-02-07 | End: 2025-02-07 | Stop reason: HOSPADM

## 2025-02-07 RX ORDER — SODIUM CHLORIDE, SODIUM LACTATE, POTASSIUM CHLORIDE, CALCIUM CHLORIDE 600; 310; 30; 20 MG/100ML; MG/100ML; MG/100ML; MG/100ML
INJECTION, SOLUTION INTRAVENOUS CONTINUOUS
Status: DISCONTINUED | OUTPATIENT
Start: 2025-02-07 | End: 2025-02-07

## 2025-02-07 RX ORDER — HYDROCODONE BITARTRATE AND ACETAMINOPHEN 5; 325 MG/1; MG/1
2 TABLET ORAL ONCE AS NEEDED
Status: COMPLETED | OUTPATIENT
Start: 2025-02-07 | End: 2025-02-07

## 2025-02-07 RX ORDER — LIDOCAINE HYDROCHLORIDE 10 MG/ML
INJECTION, SOLUTION INFILTRATION; PERINEURAL AS NEEDED
Status: DISCONTINUED | OUTPATIENT
Start: 2025-02-07 | End: 2025-02-07 | Stop reason: HOSPADM

## 2025-02-07 RX ORDER — HYDROMORPHONE HYDROCHLORIDE 1 MG/ML
0.6 INJECTION, SOLUTION INTRAMUSCULAR; INTRAVENOUS; SUBCUTANEOUS EVERY 5 MIN PRN
Status: DISCONTINUED | OUTPATIENT
Start: 2025-02-07 | End: 2025-02-07

## 2025-02-07 RX ORDER — NALOXONE HYDROCHLORIDE 0.4 MG/ML
0.08 INJECTION, SOLUTION INTRAMUSCULAR; INTRAVENOUS; SUBCUTANEOUS AS NEEDED
Status: DISCONTINUED | OUTPATIENT
Start: 2025-02-07 | End: 2025-02-07

## 2025-02-07 RX ORDER — HYDROCODONE BITARTRATE AND ACETAMINOPHEN 5; 325 MG/1; MG/1
1 TABLET ORAL ONCE AS NEEDED
Status: COMPLETED | OUTPATIENT
Start: 2025-02-07 | End: 2025-02-07

## 2025-02-07 RX ORDER — HYDROMORPHONE HYDROCHLORIDE 1 MG/ML
0.4 INJECTION, SOLUTION INTRAMUSCULAR; INTRAVENOUS; SUBCUTANEOUS EVERY 5 MIN PRN
Status: DISCONTINUED | OUTPATIENT
Start: 2025-02-07 | End: 2025-02-07

## 2025-02-07 RX ORDER — DEXAMETHASONE SODIUM PHOSPHATE 4 MG/ML
VIAL (ML) INJECTION AS NEEDED
Status: DISCONTINUED | OUTPATIENT
Start: 2025-02-07 | End: 2025-02-07 | Stop reason: SURG

## 2025-02-07 RX ORDER — ACETAMINOPHEN 500 MG
1000 TABLET ORAL ONCE AS NEEDED
Status: COMPLETED | OUTPATIENT
Start: 2025-02-07 | End: 2025-02-07

## 2025-02-07 RX ORDER — ONDANSETRON 2 MG/ML
INJECTION INTRAMUSCULAR; INTRAVENOUS AS NEEDED
Status: DISCONTINUED | OUTPATIENT
Start: 2025-02-07 | End: 2025-02-07 | Stop reason: SURG

## 2025-02-07 RX ORDER — HYDROMORPHONE HYDROCHLORIDE 1 MG/ML
0.2 INJECTION, SOLUTION INTRAMUSCULAR; INTRAVENOUS; SUBCUTANEOUS EVERY 5 MIN PRN
Status: DISCONTINUED | OUTPATIENT
Start: 2025-02-07 | End: 2025-02-07

## 2025-02-07 RX ORDER — DEXAMETHASONE SODIUM PHOSPHATE 4 MG/ML
VIAL (ML) INJECTION AS NEEDED
Status: DISCONTINUED | OUTPATIENT
Start: 2025-02-07 | End: 2025-02-07 | Stop reason: HOSPADM

## 2025-02-07 RX ORDER — BUPIVACAINE HYDROCHLORIDE 5 MG/ML
INJECTION, SOLUTION EPIDURAL; INTRACAUDAL AS NEEDED
Status: DISCONTINUED | OUTPATIENT
Start: 2025-02-07 | End: 2025-02-07 | Stop reason: HOSPADM

## 2025-02-07 RX ORDER — HYDROCODONE BITARTRATE AND ACETAMINOPHEN 5; 325 MG/1; MG/1
1-2 TABLET ORAL EVERY 6 HOURS PRN
Qty: 20 TABLET | Refills: 0 | Status: SHIPPED | OUTPATIENT
Start: 2025-02-07

## 2025-02-07 RX ADMIN — ONDANSETRON 4 MG: 2 INJECTION INTRAMUSCULAR; INTRAVENOUS at 14:48:00

## 2025-02-07 RX ADMIN — DEXAMETHASONE SODIUM PHOSPHATE 4 MG: 4 MG/ML VIAL (ML) INJECTION at 14:48:00

## 2025-02-07 NOTE — BRIEF OP NOTE
Pre-Operative Diagnosis:   Glomus tumor vs soft tissue mass, left 4th and 5th toes     Post-Operative Diagnosis:   Same     Procedure Performed:   SOFT TISSUE MASS EXCISION LEFT FIFTH TOE, BIOPSY OF LEFT FOURTH TOE    Surgeons and Role:     * Papo Mcnally DPM - Primary    Assistant(s):   none     Surgical Findings: consistent with pre op diagnosis     Specimen:   Soft tissue mass, left 4th and 5th toes--path     Estimated Blood Loss: Blood Output: 2 mL (2/7/2025  3:03 PM)      Dictation Number:  See Epic    Papo Mcnally DPM  2/7/2025  3:20 PM

## 2025-02-07 NOTE — ANESTHESIA POSTPROCEDURE EVALUATION
[facility] Patient Status:  Hospital Outpatient Surgery   Age/Gender 35 year old male MRN [id number]   Location [facility] SURGERY Attending Papo Mcnally DPM   Hosp Day # 0 PCP No primary care provider on file.       Anesthesia Post-op Note    SOFT TISSUE MASS EXCISION LEFT FIFTH TOE, BIOPSY OF LEFT FOURTH TOE    Procedure Summary       Date: 02/07/25 Room / Location:  MAIN OR 03 / [facility] MAIN OR    Anesthesia Start: 1427 Anesthesia Stop: 1513    Procedure: SOFT TISSUE MASS EXCISION LEFT FIFTH TOE, BIOPSY OF LEFT FOURTH TOE (Left: Foot) Diagnosis:       Glomus tumor      Mass of soft tissue of foot      (Glomus tumor [D18.00]Mass of soft tissue of foot [M79.89])    Surgeons: Papo Mcnally DPM Anesthesiologist: Dwight Li MD    Anesthesia Type: general ASA Status: 2            Anesthesia Type: general    Vitals Value Taken Time   /72 02/07/25 1513   Temp 97.4 °F (36.3 °C) 02/07/25 1513   Pulse 98 02/07/25 1513   Resp 16 02/07/25 1513   SpO2 96 % 02/07/25 1513           Patient Location: PACU    Anesthesia Type: general    Airway Patency: patent    Postop Pain Control: adequate    Mental Status: mildly sedated but able to meaningfully participate in the post-anesthesia evaluation    Nausea/Vomiting: none    Cardiopulmonary/Hydration status: stable euvolemic    Complications: no apparent anesthesia related complications    Postop vital signs: stable    Dental Exam: Unchanged from Preop    Patient to be discharged from PACU when criteria met.

## 2025-02-07 NOTE — ANESTHESIA PREPROCEDURE EVALUATION
PRE-OP EVALUATION    Patient Name: Tai Zeng    Admit Diagnosis: Glomus tumor [D18.00]  Mass of soft tissue of foot [M79.89]    Pre-op Diagnosis: Glomus tumor [D18.00]  Mass of soft tissue of foot [M79.89]    SOFT TISSUE MASS EXCISION LEFT FIFTH TOE, BIOPSY OF LEFT FOURTH TOE    Anesthesia Procedure: SOFT TISSUE MASS EXCISION LEFT FIFTH TOE, BIOPSY OF LEFT FOURTH TOE (Left: Foot)    Surgeons and Role:     * Papo Mcnally DPM - Primary    Pre-op vitals reviewed.  Temp: 97.8 °F (36.6 °C)  Pulse: 87  Resp: 16  BP: 139/97  SpO2: 100 %  Body mass index is 38.31 kg/m².    Current medications reviewed.  Hospital Medications:   acetaminophen (Tylenol Extra Strength) tab 1,000 mg  1,000 mg Oral Once    scopolamine (Transderm-Scop) 1 MG/3DAYS patch 1 patch  1 patch Transdermal Once    lactated ringers infusion   Intravenous Continuous    ceFAZolin (Ancef) 2g in 10mL IV syringe premix  2 g Intravenous Once       Outpatient Medications:   Prescriptions Prior to Admission[1]    Allergies: Patient has no known allergies.      Anesthesia Evaluation        Anesthetic Complications  (-) history of anesthetic complications         GI/Hepatic/Renal    Negative GI/hepatic/renal ROS.                             Cardiovascular    Negative cardiovascular ROS.    Exercise tolerance: good     MET: >4    (+) obesity  (+) hypertension                                     Endo/Other    Negative endo/other ROS.                              Pulmonary    Negative pulmonary ROS.                       Neuro/Psych                                      Past Surgical History:   Procedure Laterality Date    Other surgical history  06/2024    Foot surgery     Social History     Socioeconomic History    Marital status: Single   Tobacco Use    Smoking status: Every Day     Current packs/day: 0.50     Average packs/day: 0.5 packs/day for 17.1 years (8.6 ttl pk-yrs)     Types: Cigarettes     Start date: 2008    Smokeless tobacco: Never   Vaping  Use    Vaping status: Never Used   Substance and Sexual Activity    Alcohol use: Not Currently    Drug use: Yes     Types: Cannabis     History   Drug Use    Types: Cannabis     Available pre-op labs reviewed.               Airway      Mallampati: III  Mouth opening: 3 FB  TM distance: 4 - 6 cm  Neck ROM: full Cardiovascular      Rhythm: regular  Rate: normal     Dental             Pulmonary      Breath sounds clear to auscultation bilaterally.               Other findings              ASA: 2   Plan: MAC  NPO status verified and patient meets guidelines.  Patient has not taken beta blockers in last 24 hours.  Post-procedure pain management plan discussed with surgeon and patient.    Comment: Consented for MAC anesthesia with GA as backup as needed  Plan/risks discussed with: patient                Present on Admission:  **None**             [1]   Medications Prior to Admission   Medication Sig Dispense Refill Last Dose/Taking    atorvastatin 10 MG Oral Tab Take 1 tablet (10 mg total) by mouth daily.   2/5/2025    HYDROcodone-acetaminophen (NORCO) 5-325 MG Oral Tab Take 1 tablet by mouth every 6 (six) hours as needed for Pain. (Patient not taking: Reported on 1/23/2025) 20 tablet 0 More than a month    HYDROcodone-acetaminophen 5-325 MG Oral Tab Take 1-2 tablets by mouth every 4 (four) hours as needed for Pain. (Patient not taking: Reported on 1/23/2025) 30 tablet 0 More than a month

## 2025-02-07 NOTE — DISCHARGE INSTRUCTIONS
Leave dressings c/d/I  Ambulate with post op shoe  Elevate foot when at rest  Take norco as needed for pain  Follow up in podiatry office in 1 week    You have been given a prescription for Norco 5/325  Norco was Given to you at: 4:48PM  Next dose due: 10:48PM  Take this medication as directed  This medication contains Tylenol (acetaminophen)  Do not take additional Tylenol while taking Norco    Norco is a Narcotic and can be constipating or upset your stomach  Don't take Norco on an empty stomach  Drink plenty of water  Alcoholic beverages should be avoided while taking narcotics

## 2025-02-08 ENCOUNTER — TELEPHONE (OUTPATIENT)
Dept: PODIATRY CLINIC | Facility: CLINIC | Age: 35
End: 2025-02-08

## 2025-02-08 PROBLEM — M79.89 SOFT TISSUE MASS: Status: ACTIVE | Noted: 2025-02-08

## 2025-02-08 NOTE — TELEPHONE ENCOUNTER
Called patient after surgery was performed yesterday. Doing well and pain is under control. All questions answered to satisfaction.

## 2025-02-08 NOTE — TELEPHONE ENCOUNTER
Procedure date:2-7-2025  SOFT TISSUE MASS EXCISION LEFT FIFTH TOE, BIOPSY OF LEFT FOURTH TOE     How are you feeling? Going good  Any bleeding? /no   Is the dressing dry & intact? yes  Level of pain? / 4-5  Character of pain:/ stinging   Onset of pain:/ this morning  Aggravating factors:/ Needs to place pillows better under his leg  Duration of pain:/ not very long  Alleviating factors:/ We went over application of ice and protocol  Are you taking the prescribed medication?/ norco  1x yesterday, none today  Are you following all of the PO instructions?  Appetite OK    Other Comments:Not applying ice so we discussed it as above    Follow-up appt  date: 2-    Pt was advised if they have any concerns after hours to call our office and they would be directed to on call physician. / DONE

## 2025-02-08 NOTE — OPERATIVE REPORT
OPERATIVE REPORT     Tai Zeng Patient Status:  Hospital Outpatient Surgery    1990 MRN [id number]   Location [facility] PERIOPERATIVE SERVICE Attending No att. providers found   Hosp Day # 0 PCP No primary care provider on file.     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     Postoperative Diagnosis: same    Primary Surgeon: Papo Mcnally DPM    Assistant: none    Procedures:   Soft tissue mass excision, left 5th toe  Soft tissue mass biopsy/excision, left 4th toe    Surgical Findings: consistent with pre op diagnosis    Anesthesia: General    Complications: none    Implants: 4 mg dexamethasone    Specimen:   Soft tissue mass, left 4th and 5th toes--path     Drains: none    Condition: vital signs table with CFT intact to digits    Estimated Blood Loss: 2 mL    Preoperative history/indications:  Patient is a pleasant 35-year-old male has been followed in the podiatry clinic for painful soft tissue mass to his left fourth and fifth toes.  Patient is well-known to me as he was admitted to [facility] with bleeding large soft tissue mass to his left fourth toe in April of last year.  I did remove soft tissue mass from his fourth toe which showed findings consistent with glomus tumor.  This site did heal uneventfully but he still does have some hypersensitivity to plantar toe.  He has also developed similar mass to his left fifth toe that is much smaller in size that causes pain.  He has been scheduled for soft tissue mass excision of his left fifth toe and biopsy of his left fourth toe to see if any recurrence is noted.  He does understand that if sites recur he may benefit from referral  to oncology for possible radiation treatment.    Informed Consent:  All treatment options have been discussed with the patient including both conservative and surgical attempts at correction. Potential risks and complications of  surgical intervention were discussed at length including but not limited to death, loss of limb, post op pain, swelling, infection, bleeding, reoccurrence, extended healing,  and the possibility of further and future surgery.  No guarantees have been made to the patient and the informed consent has been signed.    Procedure in detail:  The patient was brought into the operating room and placed on the operating table in the supine position.  A timeout was called in the presence of the anesthesiologist, circulating nurse, scrub tech, and myself to confirm the correct patient, patient's date of birth, procedure, and location of the procedure to be performed.  All present were in agreement.  A pneumatic tourniquet was placed about the patient's left ankle.  Following IV sedation, patient continued to move. He was converted to general anesthesia. A local infiltrative block was administered about the left 4th and 5th rays utilizing 10 ccs of a 1:1 mixture of 1% lidocaine plain and 0.5% marcaine plain. The left foot was then scrubbed, prepped, and draped in the usual aseptic manner.  An Esmarch bandage was utilized to exsanguinate the lower extremity and the pneumatic tourniquet was inflated to 250 mmHg where it remained for the duration of the procedure.      Attention was directed to the left 5th toe where a soft tissue mass was noted at the plantar aspect of the toe. A large wedge incision was made to the plantar aspect of the digit with care taken to preserve healthy skin flaps medially and laterally. Dissection was continued with sharp blunt dissection to preserve healthy skin and dissect the soft tissue mass free. Dissection was continued the mass did appear to arise from the flexor tendon on the plantar aspect of the 5th toe. This was fully resected at this time. The soft tissue mass was removed in field in toto. It measured 2.5 cm x 1 cm x 0.5 cm. The mass was sent to pathology for further evaluation. At this time  the site was inspected. No evidence of remaining mass was appreciated. The site was copiously irrigated with saline. Skin was easily reapproximated with 4-0 nylon suture without excessive tension.     Attention was then directed to the fourth toe where mass had previously been resected.  No visible recurrence is noted but patient did have a lot of hypersensitivity to plantar aspect of toe and was concerned for recurrence.  A small wedge incision was made and there did appear to be slight recurrence of the mass.  This was dissected free and all visible soft tissue mass was removed from the field at this time.  This was sent to pathology for further evaluation.  Site was then copiously irrigated with saline.  Skin of fourth toe was then reapproximated with 4-0 nylon suture without incident.    The incision was dressed with betadine soaked adaptic and covered with sterile compressive dressings consisting of gauze, kerlix, and a mildly compressive ACE wrap.  The pneumatic ankle tourniquet was then deflated and a prompt hyperemic response was noted to all digits of the left foot.  The patient tolerated the procedure and anesthesia well.  He was transferred to the recovery room with VSS and vascular status intact.  Following a period of postoperative monitoring, the patient will be discharged home on the following written and oral postop instructions: keep dressings CDI, avoid excessive ambulation, ice and elevate foot when at rest, wear surgical shoe at all times when ambulating, contact my office for all postoperative f/u care and should any problems arise.      Papo Mcnally DPM   2/7/2025

## 2025-02-13 ENCOUNTER — OFFICE VISIT (OUTPATIENT)
Dept: PODIATRY CLINIC | Facility: CLINIC | Age: 35
End: 2025-02-13

## 2025-02-13 DIAGNOSIS — D18.00 GLOMUS TUMOR: Primary | ICD-10-CM

## 2025-02-13 DIAGNOSIS — Z47.89 ORTHOPEDIC AFTERCARE: ICD-10-CM

## 2025-02-13 DIAGNOSIS — M79.89 MASS OF SOFT TISSUE OF FOOT: ICD-10-CM

## 2025-02-13 PROCEDURE — 99024 POSTOP FOLLOW-UP VISIT: CPT

## 2025-02-14 NOTE — PROGRESS NOTES
[facility]  Progress Note      Chief Complaint   Patient presents with    Post-Op     1st POV- S/p L 4th and L 5th toe sx on 2/07/2025 w/ Dr Mcnally- rates pain 2/10 on and off- pt ambulates w/ post op shoe         HPI:     Pt is a pleasant 35 year old male who presents for first post-op visit. Pt had soft tissue mass excision left fifth toe, biopsy of left fourth toe completed by Dr. Mcnally on 2/7/2025. Pt has dressings intact and is without pain at this time. He has been ambulating in postop shoe. Pt appears to be doing very well. No other complaints are mentioned. Denies complaints of nausea, vomiting, fever, chills, shortness of breath, chest pain or calf pain.      Allergies: Patient has no known allergies.   Current Outpatient Medications   Medication Sig Dispense Refill    HYDROcodone-acetaminophen 5-325 MG Oral Tab Take 1-2 tablets by mouth every 6 (six) hours as needed for Pain. 20 tablet 0    atorvastatin 10 MG Oral Tab Take 1 tablet (10 mg total) by mouth daily.      HYDROcodone-acetaminophen (NORCO) 5-325 MG Oral Tab Take 1 tablet by mouth every 6 (six) hours as needed for Pain. (Patient not taking: Reported on 1/23/2025) 20 tablet 0    HYDROcodone-acetaminophen 5-325 MG Oral Tab Take 1-2 tablets by mouth every 4 (four) hours as needed for Pain. (Patient not taking: Reported on 1/23/2025) 30 tablet 0      Past Medical History:    Anemia    Colitis    High blood pressure    PT DENIES    High cholesterol    Migraines    Prediabetes      Past Surgical History:   Procedure Laterality Date    Other surgical history  06/2024    Foot surgery      History reviewed. No pertinent family history.   Social History     Socioeconomic History    Marital status: Single   Tobacco Use    Smoking status: Every Day     Current packs/day: 0.50     Average packs/day: 0.5 packs/day for 17.1 years (8.6 ttl pk-yrs)     Types: Cigarettes     Start date: 2008    Smokeless tobacco: Never   Vaping Use    Vaping status:  Never Used   Substance and Sexual Activity    Alcohol use: Not Currently    Drug use: Yes     Types: Cannabis           REVIEW OF SYSTEMS:     10 point ROS completed and was negative, except for pertinent positive and negatives stated in subjective.       EXAM:     GENERAL: well developed, well nourished, in no apparent distress    EXTREMITIES:  1. Integument: Normal skin temperature and turgor. Incision is well coapted with sutures intact. No dehiscence, redness, drainage, or signs of infection.      2. Vascular: Dorsalis pedis 2/4 bilateral and posterior tibial pulses 2/4 bilateral, capillary refill normal.  3. Neurological: Normal sharp dull sensation; reflexes normal.  4. Musculoskeletal: All muscle groups are graded 5/5 in the foot and ankle. Patient does have pain with palpation to incision site.      ASSESSMENT AND PLAN:   Diagnoses and all orders for this visit:    Glomus tumor    Orthopedic aftercare    Mass of soft tissue of foot        Plan:   -Patient was seen and evaluated today in clinic.  Chart history reviewed.  -Status post soft tissue mass excision left fifth toe, biopsy of left fourth toe (DOS: 2/7/2025).    Pathology report:  Final Diagnosis:   A.  Soft tissue mass left fifth toe:  -Glomovenous malformation (glomangioma).  -The lesional tissue focally extends to the soft tissue inked margin.  -Skin surface is calloused.     B.  Soft tissue mass left fourth toe:  -Disrupted pieces of glomovenous malformation (glomangioma).  -Lesion received in pieces, with lesional tissue inked margins.  -Skin surface is calloused.          -Site inspected.  Noted to be healing well without concerns at this time. Site redressed with Betadine, dry dressing, mildly compressive Ace wrap. Please keep dressings clean, dry, and intact until next follow-up visit.  -Pt to continue weight-bear as tolerated in post-op shoe  at this time.  Should still try to elevate and rest is much as possible.    -Can take ibuprofen or  tylenol as needed for pain.  -Educated patient on acute signs of infection advised patient to seek immediate medical attention if any concerns arise.  -All of the patient's questions and concerns were addressed.  They indicated their understanding of these issues and agrees to the plan.    Time spent reviewing pertinent information from patient's chart, reviewing any pertinent imaging, obtaining history and physical exam, discussing and mutually agreeing on a treatment plan, and documenting encounter: 20 minutes    RTC 1 week for incision check or sooner if other concerns arise.    TYLER Lr        [facility]            Dragon speech recognition software was used to prepare this note.  Errors in word recognition may occur.  Please contact me with any questions/concerns with this note.

## 2025-02-20 ENCOUNTER — OFFICE VISIT (OUTPATIENT)
Dept: PODIATRY CLINIC | Facility: CLINIC | Age: 35
End: 2025-02-20

## 2025-02-20 DIAGNOSIS — D18.00 GLOMUS TUMOR: ICD-10-CM

## 2025-02-20 DIAGNOSIS — Z47.89 ORTHOPEDIC AFTERCARE: Primary | ICD-10-CM

## 2025-02-20 DIAGNOSIS — M79.675 TOE PAIN, LEFT: ICD-10-CM

## 2025-02-20 DIAGNOSIS — M79.89 MASS OF SOFT TISSUE OF FOOT: ICD-10-CM

## 2025-02-20 PROCEDURE — 99024 POSTOP FOLLOW-UP VISIT: CPT | Performed by: STUDENT IN AN ORGANIZED HEALTH CARE EDUCATION/TRAINING PROGRAM

## 2025-02-20 NOTE — PROGRESS NOTES
[facility]  Progress Note      Chief Complaint   Patient presents with    Post-Op     Left foot STME f/u - had sx on 2/7/25 - states he is ok  - has pain rated as 5-6/10 with walking - has still some numbness and tingling in the foot          HPI:     Pt is a pleasant 35 year old male who presents for first post-op visit. Pt had soft tissue mass excision left fifth toe, biopsy of left fourth toe completed on 2/7/2025. Pt has dressings intact and does have some pain that he rates at a 5-6/10 at this time. He has been ambulating in postop shoe. No other complaints are mentioned. Denies complaints of nausea, vomiting, fever, chills, shortness of breath, chest pain or calf pain.      Allergies: Patient has no known allergies.   Current Outpatient Medications   Medication Sig Dispense Refill    HYDROcodone-acetaminophen 5-325 MG Oral Tab Take 1-2 tablets by mouth every 6 (six) hours as needed for Pain. 20 tablet 0    atorvastatin 10 MG Oral Tab Take 1 tablet (10 mg total) by mouth daily.      HYDROcodone-acetaminophen (NORCO) 5-325 MG Oral Tab Take 1 tablet by mouth every 6 (six) hours as needed for Pain. (Patient not taking: Reported on 1/23/2025) 20 tablet 0    HYDROcodone-acetaminophen 5-325 MG Oral Tab Take 1-2 tablets by mouth every 4 (four) hours as needed for Pain. (Patient not taking: Reported on 1/23/2025) 30 tablet 0      Past Medical History:    Anemia    Colitis    High blood pressure    PT DENIES    High cholesterol    Migraines    Prediabetes      Past Surgical History:   Procedure Laterality Date    Other surgical history  06/2024    Foot surgery      History reviewed. No pertinent family history.   Social History     Socioeconomic History    Marital status: Single   Tobacco Use    Smoking status: Every Day     Current packs/day: 0.50     Average packs/day: 0.5 packs/day for 17.1 years (8.6 ttl pk-yrs)     Types: Cigarettes     Start date: 2008    Smokeless tobacco: Never   Vaping Use     Vaping status: Never Used   Substance and Sexual Activity    Alcohol use: Not Currently    Drug use: Yes     Types: Cannabis           REVIEW OF SYSTEMS:     10 point ROS completed and was negative, except for pertinent positive and negatives stated in subjective.       EXAM:     GENERAL: well developed, well nourished, in no apparent distress    EXTREMITIES:  1. Integument: Normal skin temperature and turgor. Incision is well coapted with sutures intact. No dehiscence, redness, drainage, or signs of infection.    2. Vascular: Dorsalis pedis 2/4 bilateral and posterior tibial pulses 2/4 bilateral, capillary refill normal.  3. Neurological: Normal sharp dull sensation; reflexes normal.  4. Musculoskeletal: All muscle groups are graded 5/5 in the foot and ankle. Patient does have pain with palpation to incision site.      ASSESSMENT AND PLAN:   Diagnoses and all orders for this visit:    Orthopedic aftercare    Glomus tumor    Mass of soft tissue of foot    Toe pain, left        Plan:   -Patient was seen and evaluated today in clinic.  Chart history reviewed.  -Status post soft tissue mass excision left fifth toe, biopsy of left fourth toe (DOS: 2/7/2025).    Pathology report:  Final Diagnosis:   A.  Soft tissue mass left fifth toe:  -Glomovenous malformation (glomangioma).  -The lesional tissue focally extends to the soft tissue inked margin.  -Skin surface is calloused.     B.  Soft tissue mass left fourth toe:  -Disrupted pieces of glomovenous malformation (glomangioma).  -Lesion received in pieces, with lesional tissue inked margins.  -Skin surface is calloused.          -Site inspected.  Noted to be healing well without concerns at this time.  Sutures removed with sterile suture removal kit without incident.  Site redressed with Betadine, dry dressing, mildly compressive Ace wrap.  Starting on Monday can remove dressings and get surgical site wet.  Should not soak or scrub gently pat site dry.  -Pt to continue  weight-bear as tolerated in post-op shoe  at this time.  Should still try to elevate and rest is much as possible.    -Can take ibuprofen or tylenol as needed for pain.  -Educated patient on acute signs of infection advised patient to seek immediate medical attention if any concerns arise.  -All of the patient's questions and concerns were addressed.  They indicated their understanding of these issues and agrees to the plan.    Time spent reviewing pertinent information from patient's chart, reviewing any pertinent imaging, obtaining history and physical exam, discussing and mutually agreeing on a treatment plan, and documenting encounter: 20 minutes    RTC 1 or 2 weeks for follow-up.    Papo Mcnally DPM, 02/20/25, 10:22 AM      [facility]            Dragon speech recognition software was used to prepare this note.  Errors in word recognition may occur.  Please contact me with any questions/concerns with this note.

## 2025-02-24 ENCOUNTER — TELEPHONE (OUTPATIENT)
Dept: PODIATRY CLINIC | Facility: CLINIC | Age: 35
End: 2025-02-24

## 2025-02-24 NOTE — TELEPHONE ENCOUNTER
Pt called.  Last appointment 2-20-25.  Noticed one stitch left.  Pt has an upcoming appointment 3-6-25.  Should pt be seen sooner.  Please call pt

## 2025-02-24 NOTE — TELEPHONE ENCOUNTER
S/w patient- He states that he has 1 remaining stitch in his toe after the 2/20/24 appointment. I booked him for 2/25/25 appointment at 3pm. He accepted.     BILL

## 2025-02-25 ENCOUNTER — OFFICE VISIT (OUTPATIENT)
Dept: PODIATRY CLINIC | Facility: CLINIC | Age: 35
End: 2025-02-25
Payer: MEDICAID

## 2025-02-25 DIAGNOSIS — D18.00 GLOMUS TUMOR: ICD-10-CM

## 2025-02-25 DIAGNOSIS — Z47.89 ORTHOPEDIC AFTERCARE: Primary | ICD-10-CM

## 2025-02-25 DIAGNOSIS — M79.89 MASS OF SOFT TISSUE OF FOOT: ICD-10-CM

## 2025-03-03 NOTE — PROGRESS NOTES
[facility]  Progress Note      Chief Complaint   Patient presents with    Post-Op     Left foot- patient denies pain- patient states he is here to one suture removed          HPI:     Pt is a pleasant 35 year old male who RTC for post-op visit. Pt had soft tissue mass excision left fifth toe, biopsy of left fourth toe completed on 2/7/2025. Pt is without pain at this time.  He does believe he has 1 remaining stitch in his left foot.  He has resumed normal tennis shoes. No other complaints are mentioned. Denies complaints of nausea, vomiting, fever, chills, shortness of breath, chest pain or calf pain.      Allergies: Patient has no known allergies.   Current Outpatient Medications   Medication Sig Dispense Refill    HYDROcodone-acetaminophen 5-325 MG Oral Tab Take 1-2 tablets by mouth every 6 (six) hours as needed for Pain. 20 tablet 0    atorvastatin 10 MG Oral Tab Take 1 tablet (10 mg total) by mouth daily.      HYDROcodone-acetaminophen (NORCO) 5-325 MG Oral Tab Take 1 tablet by mouth every 6 (six) hours as needed for Pain. (Patient not taking: Reported on 2/25/2025) 20 tablet 0    HYDROcodone-acetaminophen 5-325 MG Oral Tab Take 1-2 tablets by mouth every 4 (four) hours as needed for Pain. (Patient not taking: Reported on 2/25/2025) 30 tablet 0      Past Medical History:    Anemia    Colitis    High blood pressure    PT DENIES    High cholesterol    Migraines    Prediabetes      Past Surgical History:   Procedure Laterality Date    Other surgical history  06/2024    Foot surgery      History reviewed. No pertinent family history.   Social History     Socioeconomic History    Marital status: Single   Tobacco Use    Smoking status: Every Day     Current packs/day: 0.50     Average packs/day: 0.5 packs/day for 17.2 years (8.6 ttl pk-yrs)     Types: Cigarettes     Start date: 2008    Smokeless tobacco: Never   Vaping Use    Vaping status: Never Used   Substance and Sexual Activity    Alcohol use: Not  Currently    Drug use: Yes     Types: Cannabis           REVIEW OF SYSTEMS:     10 point ROS completed and was negative, except for pertinent positive and negatives stated in subjective.       EXAM:     GENERAL: well developed, well nourished, in no apparent distress        EXTREMITIES:  1. Integument: Normal skin temperature and turgor. Incision is well healed with 1 remaining suture.    2. Vascular: Dorsalis pedis 2/4 bilateral and posterior tibial pulses 2/4 bilateral, capillary refill normal.  3. Neurological: Normal sharp dull sensation; reflexes normal.  4. Musculoskeletal: All muscle groups are graded 5/5 in the foot and ankle.  No pain today.   ASSESSMENT AND PLAN:   Diagnoses and all orders for this visit:    Orthopedic aftercare    Glomus tumor    Mass of soft tissue of foot        Plan:   -Patient was seen and evaluated today in clinic.  Chart history reviewed.  -Status post soft tissue mass excision left fifth toe, biopsy of left fourth toe (DOS: 2/7/2025).    Pathology report:  Final Diagnosis:   A.  Soft tissue mass left fifth toe:  -Glomovenous malformation (glomangioma).  -The lesional tissue focally extends to the soft tissue inked margin.  -Skin surface is calloused.     B.  Soft tissue mass left fourth toe:  -Disrupted pieces of glomovenous malformation (glomangioma).  -Lesion received in pieces, with lesional tissue inked margins.  -Skin surface is calloused.          -Site inspected.  Noted to be healing well without concerns at this time.  1 remaining suture removed with suture removal kit without incident.   -Can continue activity as tolerated and supportive shoes at this time.   -Can continue normal bathing routine.   -Can take ibuprofen or tylenol as needed for pain.  -Educated patient on acute signs of infection advised patient to seek immediate medical attention if any concerns arise.  -All of the patient's questions and concerns were addressed.  They indicated their understanding of these  issues and agrees to the plan.      RTC 1 or 2 weeks/as needed for follow up.    Papo Mcnally DPM        Dragon speech recognition software was used to prepare this note.  Errors in word recognition may occur.  Please contact me with any questions/concerns with this note.

## (undated) DEVICE — SLEEVE COMPR MD KNEE LEN SGL USE KENDALL SCD

## (undated) DEVICE — GLOVE SUR 7.5 SENSICARE PI PIP CRM PWD F

## (undated) DEVICE — BANDAGE,GAUZE,CONFORMING,4"X75",STRL,LF: Brand: MEDLINE

## (undated) DEVICE — SOLUTION IRRIG 1000ML 0.9% NACL USP BTL

## (undated) DEVICE — STANDARD HYPODERMIC NEEDLE,POLYPROPYLENE HUB: Brand: MONOJECT

## (undated) DEVICE — C-ARM: Brand: UNBRANDED

## (undated) DEVICE — GOWN,SIRUS,FABRIC-REINFORCED,LARGE: Brand: MEDLINE

## (undated) DEVICE — LOWER EXTREMITY CDS-LF: Brand: MEDLINE INDUSTRIES, INC.

## (undated) DEVICE — DRAPE,U/SHT,SPLIT,FILM,60X84,STERILE: Brand: MEDLINE

## (undated) DEVICE — BANDAGE COHESIVE 4INX5YD TAN E POR SLF ADH

## (undated) DEVICE — DISPOSABLE TOURNIQUET CUFF SINGLE BLADDER, DUAL PORT AND QUICK CONNECT CONNECTOR: Brand: COLOR CUFF

## (undated) DEVICE — DRESSING ADAPTIC L16XW3IN OIL ADH

## (undated) DEVICE — BANDAGE,COHESIVE,TAN,4X5YD,LF,STRL: Brand: MEDLINE

## (undated) DEVICE — SOLUTION IV 1000ML 0.9% NACL PRESERVATIVE

## (undated) DEVICE — BANDAGE,GAUZE,BULKEE II,4.5"X4.1YD,STRL: Brand: MEDLINE

## (undated) DEVICE — SUT MCRYL 4-0 18IN PS-2 ABSRB UD 19MM 3/8 CIR

## (undated) DEVICE — GAUZE,SPONGE,FLUFF,6"X6.75",STRL,5/TRAY: Brand: MEDLINE

## (undated) DEVICE — HANDPIECE SET WITH HIGH FLOW TIP AND SUCTION TUBE: Brand: INTERPULSE

## (undated) DEVICE — SOLUTION PREP 4OZ 10% POVIDONE IOD SCR TOP

## (undated) NOTE — LETTER
FAX REGARDING HISTORY AND PHYSICALS  Patient Name: Tai Zeng CSN: [id number]   MRN: [id number]  : 1990 - A: 35 y    Surgeon(s):  Papo Mcnally DPM  Consent Procedure: SOFT TISSUE MASS EXCISION LEFT FIFTH TOE, BIOPSY OF LEFT FOURTH TOE  Anesthesia Type: MAC    The patient listed above is coming in for a surgical/procedural case.  An H&P is needed within 30 days with an update note within 7 days of the surgery/procedure. It is the ordering physician’s responsibility to do the H&P or make arrangements with the PCP to have the H&P completed and faxed to Pre-Admission Testing at [phone].    Surgery Date: 2025    Acceptable History & Physicals are:     (a) Done within 30 days of the surgery/procedure, with an update note within 7 days of the surgery/procedure.                                                                                                               OR    (b) Done within 7 days of the surgery/procedure.      Thank you.

## (undated) NOTE — LETTER
[address]  [address]  Authorization for Surgical Operation and Procedure     Date:___________                                                                                                         Time:__________  I hereby authorize Surgeon(s):  Papo Mcnally DPM, my physician and his/her assistants (if applicable), which may include medical students, residents, and/or fellows, to perform the following surgical operation/ procedure and administer such anesthesia as may be determined necessary by my physician:  Operation/Procedure name (s) Procedure(s):  EXCISION SOFT TISSUE MASS LEFT 4TH.  TOE POSS. LEFT TOE  AMPUTATION on Tai Zeng   2.   I recognize that during the surgical operation/procedure, unforeseen conditions may necessitate additional or different procedures than those listed above.  I, therefore, further authorize and request that the above-named surgeon, assistants, or designees perform such procedures as are, in their judgment, necessary and desirable.    3.   My surgeon/physician has discussed prior to my surgery the potential benefits, risks and side effects of this procedure; the likelihood of achieving goals; and potential problems that might occur during recuperation.  They also discussed reasonable alternatives to the procedure, including risks, benefits, and side effects related to the alternatives and risks related to not receiving this procedure.  I have had all my questions answered and I acknowledge that no guarantee has been made as to the result that may be obtained.    4.   Should the need arise during my operation/procedure, which includes change of level of care prior to discharge, I also consent to the administration of blood and/or blood products.  Further, I understand that despite careful testing and screening of blood or blood products by collecting agencies, I may still be subject to ill effects as a result of receiving a  blood transfusion and/or blood products.  The following are some, but not all, of the potential risks that can occur: fever and allergic reactions, hemolytic reactions, transmission of diseases such as Hepatitis, AIDS and Cytomegalovirus (CMV) and fluid overload.  In the event that I wish to have an autologous transfusion of my own blood, or a directed donor transfusion, I will discuss this with my physician.  Check only if Refusing Blood or Blood Products  I understand refusal of blood or blood products as deemed necessary by my physician may have serious consequences to my condition to include possible death. I hereby assume responsibility for my refusal and release the hospital, its personnel, and my physicians from any responsibility for the consequences of my refusal.          o  Refuse      5.   I authorize the use of any specimen, organs, tissues, body parts or foreign objects that may be removed from my body during the operation/procedure for diagnosis, research or teaching purposes and their subsequent disposal by hospital authorities.  I also authorize the release of specimen test results and/or written reports to my treating physician on the hospital medical staff or other referring or consulting physicians involved in my care, at the discretion of the Pathologist or my treating physician.    6.   I consent to the photographing or videotaping of the operations or procedures to be performed, including appropriate portions of my body for medical, scientific, or educational purposes, provided my identity is not revealed by the pictures or by descriptive texts accompanying them.  If the procedure has been photographed/videotaped, the surgeon will obtain the original picture, image, videotape or CD.  The hospital will not be responsible for storage, release or maintenance of the picture, image, tape or CD.    7.   I consent to the presence of a  or observers in the operating room as deemed  necessary by my physician or their designees.    8.   I recognize that in the event my procedure results in extended X-Ray/fluoroscopy time, I may develop a skin reaction.    9. If I have a Do Not Attempt Resuscitation (DNAR) order in place, that status will be suspended while in the operating room, procedural suite, and during the recovery period unless otherwise explicitly stated by me (or a person authorized to consent on my behalf). The surgeon or my attending physician will determine when the applicable recovery period ends for purposes of reinstating the DNAR order.  10. Patients having a sterilization procedure: I understand that if the procedure is successful the results will be permanent and it will therefore be impossible for me to inseminate, conceive, or bear children.  I also understand that the procedure is intended to result in sterility, although the result has not been guaranteed.   11. I acknowledge that my physician has explained sedation/analgesia administration to me including the risk and benefits I consent to the administration of sedation/analgesia as may be necessary or desirable in the judgment of my physician.    I CERTIFY THAT I HAVE READ AND FULLY UNDERSTAND THE ABOVE CONSENT TO OPERATION and/or OTHER PROCEDURE.    _________________________________________  __________________________________  Signature of Patient     Signature of Responsible Person         ___________________________________         Printed Name of Responsible Person           _________________________________                 Relationship to Patient  _________________________________________  ______________________________  Signature of Witness          Date  Time      Patient Name: Tai Zeng     : 1990                 Printed: April 15, 2024     Medical Record #: [id number]                     Page 1 of 2                                    [address]   [id number]    Consent for Anesthesia    ITai agree to be cared for by an anesthesiologist, who is specially trained to monitor me and give me medicine to put me to sleep or keep me comfortable during my procedure    I understand that my anesthesiologist is not an employee or agent of [facility] or [employer]. He or she works for [employer].    As the patient asking for anesthesia services, I agree to:  Allow the anesthesiologist (anesthesia doctor) to give me medicine and do additional procedures as necessary. Some examples are: Starting or using an “IV” to give me medicine, fluids or blood during my procedure, and having a breathing tube placed to help me breathe when I’m asleep (intubation). In the event that my heart stops working properly, I understand that my anesthesiologist will make every effort to sustain my life, unless otherwise directed by [facility] Do Not Resuscitate documents.  Tell my anesthesia doctor before my procedure:  If I am pregnant.  The last time that I ate or drank.  All of the medicines I take (including prescriptions, herbal supplements, and pills I can buy without a prescription (including street drugs/illegal medications). Failure to inform my anesthesiologist about these medicines may increase my risk of anesthetic complications.  If I am allergic to anything or have had a reaction to anesthesia before.  I understand how the anesthesia medicine will help me (benefits).  I understand that with any type of anesthesia medicine there are risks:  The most common risks are: nausea, vomiting, sore throat, muscle soreness, damage to my eyes, mouth, or teeth (from breathing tube placement).  Rare risks include: remembering what happened during my procedure, allergic reactions to medications, injury to my airway, heart, lungs, vision, nerves, or muscles and in extremely rare instances death.  My doctor has explained to me other choices  available to me for my care (alternatives).  Pregnant Patients (“epidural”):  I understand that the risks of having an epidural (medicine given into my back to help control pain during labor), include itching, low blood pressure, difficulty urinating, headache or slowing of the baby’s heart. Very rare risks include infection, bleeding, seizure, irregular heart rhythms and nerve injury.  Regional Anesthesia (“spinal”, “epidural”, & “nerve blocks”):  I understand that rare but potential complications include headache, bleeding, infection, seizure, irregular heart rhythms, and nerve injury.    I can change my mind about having anesthesia services at any time before I get the medicine.    _____________________________________________________________________________  Patient (or Representative) Signature/Relationship to Patient  Date   Time    _____________________________________________________________________________   Name (if used)    Language/Organization   Time    _____________________________________________________________________________  Anesthesiologist Signature     Date   Time  I have discussed the procedure and information above with the patient (or patient’s representative) and answered their questions. The patient or their representative has agreed to have anesthesia services.    _____________________________________________________________________________  Witness        Date   Time  I have verified that the signature is that of the patient or patient’s representative, and that it was signed before the procedure  Patient Name: Tai Zeng     : 1990                 Printed: April 15, 2024     Medical Record #: [id number]                     Page 2 of 2

## (undated) NOTE — LETTER
OSR/CANDELARIA Notification    Patient Name: Tai Zeng-Age / Sex: 1990-A: 35 y  male  Medical Records: [id number] [facility]: [id number]    Surgeon(s):  Surgeon(s):  Papo Mcnally DPM   Procedure: SOFT TISSUE MASS EXCISION LEFT FIFTH TOE, BIOPSY OF LEFT FOURTH TOE    Anesthesia Type: MAC Surgery Date: 25    During the pre-operative screening process using the [employer] questionnaire, the patient listed above was identified as being at high risk for obstructive sleep apnea.    If you feel it is appropriate to schedule a sleep study, the [facility] will give priority to patients scheduled for procedures to minimize surgical delays.     If a sleep study is completed prior to surgery, please fax the results/plan of care to Pre-Admission Testing ([phone]) as this information will be utilized in clinical decision-making regarding the patient's upcoming surgery.    Thank you for your assistance in helping us provide a safe, seamless and personal experience for your patient.    Jan Angel MD  , Department of Anesthesia